# Patient Record
Sex: FEMALE | Race: OTHER | NOT HISPANIC OR LATINO | URBAN - METROPOLITAN AREA
[De-identification: names, ages, dates, MRNs, and addresses within clinical notes are randomized per-mention and may not be internally consistent; named-entity substitution may affect disease eponyms.]

---

## 2022-04-22 ENCOUNTER — OUTPATIENT (OUTPATIENT)
Dept: OUTPATIENT SERVICES | Facility: HOSPITAL | Age: 57
LOS: 1 days | End: 2022-04-22
Payer: COMMERCIAL

## 2022-04-22 LAB
APPEARANCE CSF: CLEAR — SIGNIFICANT CHANGE UP
APPEARANCE SPUN FLD: COLORLESS — SIGNIFICANT CHANGE UP
COLOR CSF: SIGNIFICANT CHANGE UP
CSF COMMENTS: SIGNIFICANT CHANGE UP
GLUCOSE CSF-MCNC: 67 MG/DL — SIGNIFICANT CHANGE UP (ref 40–70)
MONOS+MACROS NFR CSF: 1 % — LOW (ref 15–45)
NEUTROPHILS # CSF: 0 % — SIGNIFICANT CHANGE UP (ref 0–6)
NRBC NFR CSF: 1 /UL — SIGNIFICANT CHANGE UP (ref 0–5)
PROT CSF-MCNC: 23 MG/DL — SIGNIFICANT CHANGE UP (ref 15–45)
RBC # CSF: 0 /UL — SIGNIFICANT CHANGE UP (ref 0–0)
SARS-COV-2 RNA SPEC QL NAA+PROBE: SIGNIFICANT CHANGE UP
TUBE TYPE: SIGNIFICANT CHANGE UP

## 2022-04-22 PROCEDURE — 84157 ASSAY OF PROTEIN OTHER: CPT

## 2022-04-22 PROCEDURE — 62328 DX LMBR SPI PNXR W/FLUOR/CT: CPT

## 2022-04-22 PROCEDURE — 84394 TOTAL TAU: CPT

## 2022-04-22 PROCEDURE — 36415 COLL VENOUS BLD VENIPUNCTURE: CPT

## 2022-04-22 PROCEDURE — 83520 IMMUNOASSAY QUANT NOS NONAB: CPT

## 2022-04-22 PROCEDURE — 0035U NEURO CSF PRION PRTN QUAL: CPT

## 2022-04-22 PROCEDURE — 82945 GLUCOSE OTHER FLUID: CPT

## 2022-04-22 PROCEDURE — 89051 BODY FLUID CELL COUNT: CPT

## 2022-04-22 PROCEDURE — 87635 SARS-COV-2 COVID-19 AMP PRB: CPT

## 2022-04-22 RX ORDER — LINACLOTIDE 145 UG/1
145 CAPSULE, GELATIN COATED ORAL ONCE
Refills: 0 | Status: DISCONTINUED | OUTPATIENT
Start: 2022-04-22 | End: 2022-05-06

## 2022-05-04 LAB
14-3-3 AG CSF-MCNC: SIGNIFICANT CHANGE UP

## 2022-05-10 ENCOUNTER — APPOINTMENT (OUTPATIENT)
Dept: INTERVENTIONAL RADIOLOGY/VASCULAR | Facility: HOSPITAL | Age: 57
End: 2022-05-10

## 2022-05-23 PROBLEM — Z00.00 ENCOUNTER FOR PREVENTIVE HEALTH EXAMINATION: Status: ACTIVE | Noted: 2022-05-23

## 2022-06-01 ENCOUNTER — APPOINTMENT (OUTPATIENT)
Dept: INTERVENTIONAL RADIOLOGY/VASCULAR | Facility: HOSPITAL | Age: 57
End: 2022-06-01

## 2022-06-01 ENCOUNTER — OUTPATIENT (OUTPATIENT)
Dept: OUTPATIENT SERVICES | Facility: HOSPITAL | Age: 57
LOS: 1 days | End: 2022-06-01
Payer: COMMERCIAL

## 2022-06-01 LAB — SARS-COV-2 RNA SPEC QL NAA+PROBE: SIGNIFICANT CHANGE UP

## 2022-06-01 PROCEDURE — 62328 DX LMBR SPI PNXR W/FLUOR/CT: CPT

## 2022-06-01 PROCEDURE — 36415 COLL VENOUS BLD VENIPUNCTURE: CPT

## 2022-06-01 PROCEDURE — 87635 SARS-COV-2 COVID-19 AMP PRB: CPT

## 2023-04-28 ENCOUNTER — INPATIENT (INPATIENT)
Facility: HOSPITAL | Age: 58
LOS: 4 days | Discharge: HOME | End: 2023-05-03
Attending: HOSPITALIST | Admitting: HOSPITALIST
Payer: COMMERCIAL

## 2023-04-28 VITALS
RESPIRATION RATE: 17 BRPM | TEMPERATURE: 98 F | HEART RATE: 100 BPM | SYSTOLIC BLOOD PRESSURE: 127 MMHG | DIASTOLIC BLOOD PRESSURE: 99 MMHG | OXYGEN SATURATION: 100 %

## 2023-04-28 DIAGNOSIS — R41.82 ALTERED MENTAL STATUS, UNSPECIFIED: ICD-10-CM

## 2023-04-28 LAB
ALBUMIN SERPL ELPH-MCNC: 4.6 G/DL — SIGNIFICANT CHANGE UP (ref 3.3–5)
ALP SERPL-CCNC: 80 U/L — SIGNIFICANT CHANGE UP (ref 40–120)
ALT FLD-CCNC: 17 U/L — SIGNIFICANT CHANGE UP (ref 4–33)
ANION GAP SERPL CALC-SCNC: 24 MMOL/L — HIGH (ref 7–14)
APAP SERPL-MCNC: <10 UG/ML — LOW (ref 15–25)
AST SERPL-CCNC: 53 U/L — HIGH (ref 4–32)
BASOPHILS # BLD AUTO: 0.01 K/UL — SIGNIFICANT CHANGE UP (ref 0–0.2)
BASOPHILS NFR BLD AUTO: 0.1 % — SIGNIFICANT CHANGE UP (ref 0–2)
BILIRUB SERPL-MCNC: 1 MG/DL — SIGNIFICANT CHANGE UP (ref 0.2–1.2)
BUN SERPL-MCNC: 66 MG/DL — HIGH (ref 7–23)
CA-I BLD-SCNC: 1.25 MMOL/L — SIGNIFICANT CHANGE UP (ref 1.15–1.29)
CALCIUM SERPL-MCNC: 10.4 MG/DL — SIGNIFICANT CHANGE UP (ref 8.4–10.5)
CHLORIDE SERPL-SCNC: 109 MMOL/L — HIGH (ref 98–107)
CO2 SERPL-SCNC: 16 MMOL/L — LOW (ref 22–31)
CREAT SERPL-MCNC: 1.84 MG/DL — HIGH (ref 0.5–1.3)
EGFR: 31 ML/MIN/1.73M2 — LOW
EOSINOPHIL # BLD AUTO: 0 K/UL — SIGNIFICANT CHANGE UP (ref 0–0.5)
EOSINOPHIL NFR BLD AUTO: 0 % — SIGNIFICANT CHANGE UP (ref 0–6)
ETHANOL SERPL-MCNC: <10 MG/DL — SIGNIFICANT CHANGE UP
GLUCOSE SERPL-MCNC: 163 MG/DL — HIGH (ref 70–99)
HCT VFR BLD CALC: 38.2 % — SIGNIFICANT CHANGE UP (ref 34.5–45)
HGB BLD-MCNC: 12.9 G/DL — SIGNIFICANT CHANGE UP (ref 11.5–15.5)
IANC: 6.71 K/UL — SIGNIFICANT CHANGE UP (ref 1.8–7.4)
IMM GRANULOCYTES NFR BLD AUTO: 0.4 % — SIGNIFICANT CHANGE UP (ref 0–0.9)
LYMPHOCYTES # BLD AUTO: 0.9 K/UL — LOW (ref 1–3.3)
LYMPHOCYTES # BLD AUTO: 10.6 % — LOW (ref 13–44)
MCHC RBC-ENTMCNC: 32.7 PG — SIGNIFICANT CHANGE UP (ref 27–34)
MCHC RBC-ENTMCNC: 33.8 GM/DL — SIGNIFICANT CHANGE UP (ref 32–36)
MCV RBC AUTO: 96.7 FL — SIGNIFICANT CHANGE UP (ref 80–100)
MONOCYTES # BLD AUTO: 0.81 K/UL — SIGNIFICANT CHANGE UP (ref 0–0.9)
MONOCYTES NFR BLD AUTO: 9.6 % — SIGNIFICANT CHANGE UP (ref 2–14)
NEUTROPHILS # BLD AUTO: 6.71 K/UL — SIGNIFICANT CHANGE UP (ref 1.8–7.4)
NEUTROPHILS NFR BLD AUTO: 79.3 % — HIGH (ref 43–77)
NRBC # BLD: 0 /100 WBCS — SIGNIFICANT CHANGE UP (ref 0–0)
NRBC # FLD: 0 K/UL — SIGNIFICANT CHANGE UP (ref 0–0)
PLATELET # BLD AUTO: 166 K/UL — SIGNIFICANT CHANGE UP (ref 150–400)
POTASSIUM SERPL-MCNC: 4 MMOL/L — SIGNIFICANT CHANGE UP (ref 3.5–5.3)
POTASSIUM SERPL-SCNC: 4 MMOL/L — SIGNIFICANT CHANGE UP (ref 3.5–5.3)
PROT SERPL-MCNC: 7.5 G/DL — SIGNIFICANT CHANGE UP (ref 6–8.3)
RBC # BLD: 3.95 M/UL — SIGNIFICANT CHANGE UP (ref 3.8–5.2)
RBC # FLD: 13.9 % — SIGNIFICANT CHANGE UP (ref 10.3–14.5)
SALICYLATES SERPL-MCNC: <0.3 MG/DL — LOW (ref 15–30)
SODIUM SERPL-SCNC: 149 MMOL/L — HIGH (ref 135–145)
T3 SERPL-MCNC: 55 NG/DL — LOW (ref 80–200)
T4 AB SER-ACNC: 7.2 UG/DL — SIGNIFICANT CHANGE UP (ref 5.1–13)
TOXICOLOGY SCREEN, DRUGS OF ABUSE, SERUM RESULT: SIGNIFICANT CHANGE UP
TSH SERPL-MCNC: 2.49 UIU/ML — SIGNIFICANT CHANGE UP (ref 0.27–4.2)
WBC # BLD: 8.46 K/UL — SIGNIFICANT CHANGE UP (ref 3.8–10.5)
WBC # FLD AUTO: 8.46 K/UL — SIGNIFICANT CHANGE UP (ref 3.8–10.5)

## 2023-04-28 PROCEDURE — 71045 X-RAY EXAM CHEST 1 VIEW: CPT | Mod: 26

## 2023-04-28 PROCEDURE — 99285 EMERGENCY DEPT VISIT HI MDM: CPT

## 2023-04-28 PROCEDURE — 99223 1ST HOSP IP/OBS HIGH 75: CPT

## 2023-04-28 PROCEDURE — 70450 CT HEAD/BRAIN W/O DYE: CPT | Mod: 26,MA

## 2023-04-28 RX ORDER — SODIUM CHLORIDE 9 MG/ML
1000 INJECTION INTRAMUSCULAR; INTRAVENOUS; SUBCUTANEOUS ONCE
Refills: 0 | Status: COMPLETED | OUTPATIENT
Start: 2023-04-28 | End: 2023-04-28

## 2023-04-28 RX ORDER — SODIUM CHLORIDE 9 MG/ML
500 INJECTION INTRAMUSCULAR; INTRAVENOUS; SUBCUTANEOUS ONCE
Refills: 0 | Status: DISCONTINUED | OUTPATIENT
Start: 2023-04-28 | End: 2023-04-28

## 2023-04-28 RX ORDER — MIDAZOLAM HYDROCHLORIDE 1 MG/ML
4 INJECTION, SOLUTION INTRAMUSCULAR; INTRAVENOUS ONCE
Refills: 0 | Status: DISCONTINUED | OUTPATIENT
Start: 2023-04-28 | End: 2023-04-28

## 2023-04-28 RX ADMIN — SODIUM CHLORIDE 1000 MILLILITER(S): 9 INJECTION INTRAMUSCULAR; INTRAVENOUS; SUBCUTANEOUS at 19:24

## 2023-04-28 RX ADMIN — MIDAZOLAM HYDROCHLORIDE 4 MILLIGRAM(S): 1 INJECTION, SOLUTION INTRAMUSCULAR; INTRAVENOUS at 19:52

## 2023-04-28 RX ADMIN — SODIUM CHLORIDE 1000 MILLILITER(S): 9 INJECTION INTRAMUSCULAR; INTRAVENOUS; SUBCUTANEOUS at 21:18

## 2023-04-28 NOTE — ED ADULT NURSE NOTE - NSIMPLEMENTINTERV_GEN_ALL_ED
Implemented All Fall Risk Interventions:  [place] to call system. Call bell, personal items and telephone within reach. Instruct patient to call for assistance. Room bathroom lighting operational. Non-slip footwear when patient is off stretcher. Physically safe environment: no spills, clutter or unnecessary equipment. Stretcher in lowest position, wheels locked, appropriate side rails in place. Provide visual cue, wrist band, yellow gown, etc. Monitor gait and stability. Monitor for mental status changes and reorient to person, place, and time. Review medications for side effects contributing to fall risk. Reinforce activity limits and safety measures with patient and family.

## 2023-04-28 NOTE — ED PROVIDER NOTE - ATTENDING CONTRIBUTION TO CARE
Seen and examined, discussed w resident, pt. given assigned name due to privacy issues. Seen and examined, discussed w resident, pt. given assigned name due to privacy issues.  57 female to ED after found on floor next to empty bottle of vodka this a.m.  Patient initially with decreased verbal and decreased responsiveness, in ED with alertness, inappropriate responses to questioning, but obeying commands.  Patient has recent diagnosis of early onset dementia.  Has history of substance use including alcohol and cocaine.  Previously treated for Graves' and currently on Synthroid.  Patient arrives with legal guardian and care manager and is unable to make her own decisions at time of eval.  Plan of care including admission discussed with care manager, contact numbers documented in chart.  No reported history of recent illness, no fever/chills, no history of documented trauma, no use of blood thinners.  MMM, proptotic eyes, EOMI, neck supple, clear lungs, heart reg, abd soft, NT to palp, no nancy/guarding, no CVAT, no edema, NT calves, O x 2, inappropriate to questioning, poor short term memory.

## 2023-04-28 NOTE — ED ADULT TRIAGE NOTE - DIRECT TO ROOM CARE INITIATED:
Reason for Disposition  • SEVERE chest pain    Protocols used: CHEST PAIN-A-AH     28-Apr-2023 17:45

## 2023-04-28 NOTE — ED PROVIDER NOTE - CLINICAL SUMMARY MEDICAL DECISION MAKING FREE TEXT BOX
56yo F w/ history of graves (on 75 of synthroid), constipation and polysubstance use disorder coming in w/ social workers for concerns of worsening mental status; concerning for possible worsening thyroid disorder vs electrolyte abnormalities vs worsening frontal lobe dementia; will eval w/ labs, tox screen, ct head and likely admit

## 2023-04-28 NOTE — ED ADULT NURSE REASSESSMENT NOTE - NS ED NURSE REASSESS COMMENT FT1
Hand off received from JIMMY Mejia, pt in stretcher, pt on tele with capnography monitoring, breathing even and unlabored. right ac20g inserted using aseptic technique with blood return noted and flushed well. stretcher in lowest position. pending ct scan, will continue to monitor.

## 2023-04-28 NOTE — ED PROVIDER NOTE - PROGRESS NOTE DETAILS
Pt. has legal guardian Sadia Justinjonathanharvey: [phone]  Pt. has Care manager and pt. advocate Rosina Gallardo [phone] [place],PGY3: Unclear etiology of AMS; patient HDS. No clinical signs of meningitis. Will admit for further work up

## 2023-04-28 NOTE — ED PROVIDER NOTE - NS ED ROS FT
General: denies fever, chills  HENT: denies nasal congestion, rhinorrhea  Eyes: denies visual changes, blurred vision  CV: denies chest pain, palpitations  Resp: denies difficulty breathing, cough  Abdominal: denies nausea, vomiting, diarrhea, abdominal pain  : denies urinary pain or discharge  MSK: denies muscle aches, leg swelling  Neuro: confusion  Skin: denies rashes, bruises

## 2023-04-28 NOTE — ED ADULT NURSE NOTE - OBJECTIVE STATEMENT
pt received to rm 1  , a&ox2 , ambulatory , pmh of graves disease, polysubstance abuse, and frontal lobe dementia , p/w SW for concerns regarding her mental health status. pt noted to anxious, confused regarding situation. pt also endorsed decreased PO intake  . Pt breathing even and unlabored on room air. NSR on cardiac monitor. Denies fever, chills, cough, SOB, chest pain, palpitations, dizziness, N/V/D, constipation, numbness, tingling. IV placed. Labs collected and sent. EKG in chart. pending lab R and repeat EKG  . pt educated on fall precautions and confirms understanding via teach back method. Stretcher locked in lowest position with siderails up x2. Per LEONID Waldron  pt is allowed to keep cellphone . pt other belongings will be secured with secuirty.

## 2023-04-28 NOTE — ED PROVIDER NOTE - PHYSICAL EXAMINATION
GENERAL: well appearing in no apparent distress  HEAD: normocephalic, atraumatic  HENT: airway intact  EYES: +Proptosis; pupils constricted 1+, reactive to light b/l   CARDIAC: regular rate and rhythm, normal S1S2, no appreciable murmurs, 2+ pulses in UE/LE b/l  PULM: normal breath sounds, clear to ascultation bilaterally, no rales, rhonchi, wheezing  GI: abdomen nondistended, soft, nontender, no guarding, rebound tenderness  NEURO: no focal motor or sensory deficits, minimally participatory on exam  MSK: no peripheral edema  SKIN: well-perfused, extremities warm, no visible rashes

## 2023-04-28 NOTE — ED ADULT TRIAGE NOTE - MODE OF ARRIVAL
Walk in Patient is now fully awake, with vital signs and temperature stable, hydration is adequate, patients Herber’s  score is at baseline (or greater than 8), patient and escort has received  discharge education.

## 2023-04-28 NOTE — ED PROVIDER NOTE - OBJECTIVE STATEMENT
58yo F w/ history of graves (on 75 of synthroid), constipation and polysubstance use disorder coming in w/ social workers for concerns of worsening mental status. History provided by aids: States patient has had alcohol and cocaine use disorder for several years. Was admitted to a rehab facility last August and was recently diagnosed with frontal lobe dementia. SW at bedside states she has decreased PO intake, increasingly worsening confusion and aggression for 1 month. Was found on the floor today with a nearly empty bottle of vodka.   Patient is minimal verbal on exam and only answers yes and no questions.

## 2023-04-28 NOTE — ED ADULT NURSE REASSESSMENT NOTE - NS ED NURSE REASSESS COMMENT FT1
pt noted to be agitated and refusing to allow belongings to be secured. pt denies pain at this time. MD Burris brought to bedside. pt medicated per MD orders. KASIA Jha and KASIA Limon made aware.

## 2023-04-29 DIAGNOSIS — R41.82 ALTERED MENTAL STATUS, UNSPECIFIED: ICD-10-CM

## 2023-04-29 DIAGNOSIS — N17.9 ACUTE KIDNEY FAILURE, UNSPECIFIED: ICD-10-CM

## 2023-04-29 DIAGNOSIS — G93.49 OTHER ENCEPHALOPATHY: ICD-10-CM

## 2023-04-29 DIAGNOSIS — E05.00 THYROTOXICOSIS WITH DIFFUSE GOITER WITHOUT THYROTOXIC CRISIS OR STORM: ICD-10-CM

## 2023-04-29 DIAGNOSIS — R93.0 ABNORMAL FINDINGS ON DIAGNOSTIC IMAGING OF SKULL AND HEAD, NOT ELSEWHERE CLASSIFIED: ICD-10-CM

## 2023-04-29 DIAGNOSIS — G31.09 OTHER FRONTOTEMPORAL NEUROCOGNITIVE DISORDER: ICD-10-CM

## 2023-04-29 LAB
A1C WITH ESTIMATED AVERAGE GLUCOSE RESULT: 4.4 % — SIGNIFICANT CHANGE UP (ref 4–5.6)
ALBUMIN SERPL ELPH-MCNC: 3.2 G/DL — LOW (ref 3.3–5)
ALP SERPL-CCNC: 59 U/L — SIGNIFICANT CHANGE UP (ref 40–120)
ALT FLD-CCNC: 14 U/L — SIGNIFICANT CHANGE UP (ref 4–33)
AMPHET UR-MCNC: NEGATIVE — SIGNIFICANT CHANGE UP
ANION GAP SERPL CALC-SCNC: 16 MMOL/L — HIGH (ref 7–14)
ANION GAP SERPL CALC-SCNC: 19 MMOL/L — HIGH (ref 7–14)
APPEARANCE UR: ABNORMAL
AST SERPL-CCNC: 39 U/L — HIGH (ref 4–32)
BACTERIA # UR AUTO: NEGATIVE — SIGNIFICANT CHANGE UP
BARBITURATES UR SCN-MCNC: NEGATIVE — SIGNIFICANT CHANGE UP
BENZODIAZ UR-MCNC: POSITIVE
BILIRUB SERPL-MCNC: 0.6 MG/DL — SIGNIFICANT CHANGE UP (ref 0.2–1.2)
BILIRUB UR-MCNC: NEGATIVE — SIGNIFICANT CHANGE UP
BUN SERPL-MCNC: 45 MG/DL — HIGH (ref 7–23)
BUN SERPL-MCNC: 65 MG/DL — HIGH (ref 7–23)
CALCIUM SERPL-MCNC: 8.8 MG/DL — SIGNIFICANT CHANGE UP (ref 8.4–10.5)
CALCIUM SERPL-MCNC: 9.1 MG/DL — SIGNIFICANT CHANGE UP (ref 8.4–10.5)
CHLORIDE SERPL-SCNC: 110 MMOL/L — HIGH (ref 98–107)
CHLORIDE SERPL-SCNC: 114 MMOL/L — HIGH (ref 98–107)
CHOLEST SERPL-MCNC: 171 MG/DL — SIGNIFICANT CHANGE UP
CK SERPL-CCNC: 1322 U/L — HIGH (ref 25–170)
CK SERPL-CCNC: 1559 U/L — HIGH (ref 25–170)
CO2 SERPL-SCNC: 14 MMOL/L — LOW (ref 22–31)
CO2 SERPL-SCNC: 18 MMOL/L — LOW (ref 22–31)
COCAINE METAB.OTHER UR-MCNC: NEGATIVE — SIGNIFICANT CHANGE UP
COLOR SPEC: YELLOW — SIGNIFICANT CHANGE UP
CREAT SERPL-MCNC: 0.87 MG/DL — SIGNIFICANT CHANGE UP (ref 0.5–1.3)
CREAT SERPL-MCNC: 1.37 MG/DL — HIGH (ref 0.5–1.3)
CREATININE URINE RESULT, DAU: 94 MG/DL — SIGNIFICANT CHANGE UP
DIFF PNL FLD: NEGATIVE — SIGNIFICANT CHANGE UP
EGFR: 45 ML/MIN/1.73M2 — LOW
EGFR: 77 ML/MIN/1.73M2 — SIGNIFICANT CHANGE UP
EPI CELLS # UR: 12 /HPF — HIGH (ref 0–5)
ESTIMATED AVERAGE GLUCOSE: 80 — SIGNIFICANT CHANGE UP
FOLATE SERPL-MCNC: 6.2 NG/ML — SIGNIFICANT CHANGE UP (ref 3.1–17.5)
GLUCOSE BLDC GLUCOMTR-MCNC: 114 MG/DL — HIGH (ref 70–99)
GLUCOSE SERPL-MCNC: 106 MG/DL — HIGH (ref 70–99)
GLUCOSE SERPL-MCNC: 132 MG/DL — HIGH (ref 70–99)
GLUCOSE UR QL: NEGATIVE — SIGNIFICANT CHANGE UP
HCT VFR BLD CALC: 33 % — LOW (ref 34.5–45)
HDLC SERPL-MCNC: 103 MG/DL — SIGNIFICANT CHANGE UP
HGB BLD-MCNC: 11 G/DL — LOW (ref 11.5–15.5)
HYALINE CASTS # UR AUTO: 6 /LPF — SIGNIFICANT CHANGE UP (ref 0–7)
KETONES UR-MCNC: ABNORMAL
LEUKOCYTE ESTERASE UR-ACNC: NEGATIVE — SIGNIFICANT CHANGE UP
LIPID PNL WITH DIRECT LDL SERPL: 48 MG/DL — SIGNIFICANT CHANGE UP
MAGNESIUM SERPL-MCNC: 1.9 MG/DL — SIGNIFICANT CHANGE UP (ref 1.6–2.6)
MCHC RBC-ENTMCNC: 33.3 GM/DL — SIGNIFICANT CHANGE UP (ref 32–36)
MCHC RBC-ENTMCNC: 34.1 PG — HIGH (ref 27–34)
MCV RBC AUTO: 102.2 FL — HIGH (ref 80–100)
METHADONE UR-MCNC: NEGATIVE — SIGNIFICANT CHANGE UP
NITRITE UR-MCNC: NEGATIVE — SIGNIFICANT CHANGE UP
NON HDL CHOLESTEROL: 68 MG/DL — SIGNIFICANT CHANGE UP
NRBC # BLD: 0 /100 WBCS — SIGNIFICANT CHANGE UP (ref 0–0)
NRBC # FLD: 0.02 K/UL — HIGH (ref 0–0)
OPIATES UR-MCNC: NEGATIVE — SIGNIFICANT CHANGE UP
OXYCODONE UR-MCNC: NEGATIVE — SIGNIFICANT CHANGE UP
PCP SPEC-MCNC: SIGNIFICANT CHANGE UP
PCP UR-MCNC: NEGATIVE — SIGNIFICANT CHANGE UP
PH UR: 6 — SIGNIFICANT CHANGE UP (ref 5–8)
PLATELET # BLD AUTO: 139 K/UL — LOW (ref 150–400)
POTASSIUM SERPL-MCNC: 3.2 MMOL/L — LOW (ref 3.5–5.3)
POTASSIUM SERPL-MCNC: 3.7 MMOL/L — SIGNIFICANT CHANGE UP (ref 3.5–5.3)
POTASSIUM SERPL-SCNC: 3.2 MMOL/L — LOW (ref 3.5–5.3)
POTASSIUM SERPL-SCNC: 3.7 MMOL/L — SIGNIFICANT CHANGE UP (ref 3.5–5.3)
PROT SERPL-MCNC: 5.9 G/DL — LOW (ref 6–8.3)
PROT UR-MCNC: ABNORMAL
RBC # BLD: 3.23 M/UL — LOW (ref 3.8–5.2)
RBC # FLD: 14.2 % — SIGNIFICANT CHANGE UP (ref 10.3–14.5)
RBC CASTS # UR COMP ASSIST: 3 /HPF — SIGNIFICANT CHANGE UP (ref 0–4)
SODIUM SERPL-SCNC: 143 MMOL/L — SIGNIFICANT CHANGE UP (ref 135–145)
SODIUM SERPL-SCNC: 148 MMOL/L — HIGH (ref 135–145)
SP GR SPEC: 1.02 — SIGNIFICANT CHANGE UP (ref 1.01–1.05)
T PALLIDUM AB TITR SER: NEGATIVE — SIGNIFICANT CHANGE UP
THC UR QL: NEGATIVE — SIGNIFICANT CHANGE UP
TRIGL SERPL-MCNC: 101 MG/DL — SIGNIFICANT CHANGE UP
UROBILINOGEN FLD QL: SIGNIFICANT CHANGE UP
VIT B12 SERPL-MCNC: 632 PG/ML — SIGNIFICANT CHANGE UP (ref 200–900)
VIT D25+D1,25 OH+D1,25 PNL SERPL-MCNC: 32.7 PG/ML — SIGNIFICANT CHANGE UP (ref 19.9–79.3)
WBC # BLD: 5.15 K/UL — SIGNIFICANT CHANGE UP (ref 3.8–10.5)
WBC # FLD AUTO: 5.15 K/UL — SIGNIFICANT CHANGE UP (ref 3.8–10.5)
WBC UR QL: 6 /HPF — HIGH (ref 0–5)

## 2023-04-29 PROCEDURE — 99255 IP/OBS CONSLTJ NEW/EST HI 80: CPT

## 2023-04-29 PROCEDURE — 99291 CRITICAL CARE FIRST HOUR: CPT

## 2023-04-29 PROCEDURE — 99223 1ST HOSP IP/OBS HIGH 75: CPT

## 2023-04-29 PROCEDURE — 12345: CPT | Mod: NC

## 2023-04-29 RX ORDER — NALOXONE HYDROCHLORIDE 4 MG/.1ML
0.1 SPRAY NASAL ONCE
Refills: 0 | Status: COMPLETED | OUTPATIENT
Start: 2023-04-29 | End: 2023-04-29

## 2023-04-29 RX ORDER — ATORVASTATIN CALCIUM 80 MG/1
40 TABLET, FILM COATED ORAL AT BEDTIME
Refills: 0 | Status: DISCONTINUED | OUTPATIENT
Start: 2023-04-29 | End: 2023-04-29

## 2023-04-29 RX ORDER — LANOLIN ALCOHOL/MO/W.PET/CERES
3 CREAM (GRAM) TOPICAL AT BEDTIME
Refills: 0 | Status: DISCONTINUED | OUTPATIENT
Start: 2023-04-29 | End: 2023-05-03

## 2023-04-29 RX ORDER — ENOXAPARIN SODIUM 100 MG/ML
40 INJECTION SUBCUTANEOUS EVERY 24 HOURS
Refills: 0 | Status: DISCONTINUED | OUTPATIENT
Start: 2023-04-29 | End: 2023-04-29

## 2023-04-29 RX ORDER — HEPARIN SODIUM 5000 [USP'U]/ML
5000 INJECTION INTRAVENOUS; SUBCUTANEOUS EVERY 8 HOURS
Refills: 0 | Status: DISCONTINUED | OUTPATIENT
Start: 2023-04-29 | End: 2023-05-03

## 2023-04-29 RX ORDER — ACETAMINOPHEN 500 MG
650 TABLET ORAL EVERY 6 HOURS
Refills: 0 | Status: DISCONTINUED | OUTPATIENT
Start: 2023-04-29 | End: 2023-05-03

## 2023-04-29 RX ORDER — SODIUM CHLORIDE 9 MG/ML
1000 INJECTION, SOLUTION INTRAVENOUS
Refills: 0 | Status: DISCONTINUED | OUTPATIENT
Start: 2023-04-29 | End: 2023-04-30

## 2023-04-29 RX ORDER — LEVOTHYROXINE SODIUM 125 MCG
75 TABLET ORAL DAILY
Refills: 0 | Status: DISCONTINUED | OUTPATIENT
Start: 2023-04-29 | End: 2023-04-30

## 2023-04-29 RX ORDER — SODIUM CHLORIDE 9 MG/ML
1000 INJECTION, SOLUTION INTRAVENOUS ONCE
Refills: 0 | Status: COMPLETED | OUTPATIENT
Start: 2023-04-29 | End: 2023-04-29

## 2023-04-29 RX ORDER — POTASSIUM CHLORIDE 20 MEQ
10 PACKET (EA) ORAL ONCE
Refills: 0 | Status: COMPLETED | OUTPATIENT
Start: 2023-04-29 | End: 2023-04-29

## 2023-04-29 RX ORDER — ASPIRIN/CALCIUM CARB/MAGNESIUM 324 MG
81 TABLET ORAL DAILY
Refills: 0 | Status: DISCONTINUED | OUTPATIENT
Start: 2023-04-29 | End: 2023-05-01

## 2023-04-29 RX ORDER — ONDANSETRON 8 MG/1
4 TABLET, FILM COATED ORAL EVERY 8 HOURS
Refills: 0 | Status: DISCONTINUED | OUTPATIENT
Start: 2023-04-29 | End: 2023-05-03

## 2023-04-29 RX ORDER — LEVOTHYROXINE SODIUM 125 MCG
56 TABLET ORAL ONCE
Refills: 0 | Status: DISCONTINUED | OUTPATIENT
Start: 2023-04-29 | End: 2023-04-29

## 2023-04-29 RX ADMIN — NALOXONE HYDROCHLORIDE 0.1 MILLIGRAM(S): 4 SPRAY NASAL at 01:25

## 2023-04-29 RX ADMIN — SODIUM CHLORIDE 100 MILLILITER(S): 9 INJECTION, SOLUTION INTRAVENOUS at 01:51

## 2023-04-29 RX ADMIN — Medication 81 MILLIGRAM(S): at 11:05

## 2023-04-29 RX ADMIN — Medication 75 MICROGRAM(S): at 05:33

## 2023-04-29 RX ADMIN — HEPARIN SODIUM 5000 UNIT(S): 5000 INJECTION INTRAVENOUS; SUBCUTANEOUS at 21:51

## 2023-04-29 RX ADMIN — SODIUM CHLORIDE 1000 MILLILITER(S): 9 INJECTION, SOLUTION INTRAVENOUS at 01:10

## 2023-04-29 RX ADMIN — HEPARIN SODIUM 5000 UNIT(S): 5000 INJECTION INTRAVENOUS; SUBCUTANEOUS at 13:58

## 2023-04-29 RX ADMIN — SODIUM CHLORIDE 100 MILLILITER(S): 9 INJECTION, SOLUTION INTRAVENOUS at 13:57

## 2023-04-29 RX ADMIN — HEPARIN SODIUM 5000 UNIT(S): 5000 INJECTION INTRAVENOUS; SUBCUTANEOUS at 05:33

## 2023-04-29 RX ADMIN — Medication 100 MILLIEQUIVALENT(S): at 01:51

## 2023-04-29 NOTE — RAPID RESPONSE TEAM SUMMARY - NSSITUATIONBACKGROUNDRRT_GEN_ALL_CORE
57 yr old female with a pmh of frontal lobe dementia, Graves disease now on levothyroxine 75mcg daily, constipation and polysubstance abuse who presents with her  with concerns for worsening mental status. Rapid response called for concern for mental status on arrival to the floor.  Upon arrival to the rapid, patient's vital signs stable, systolic blood pressures 120s to 140s, respiratory rate 12-16, heart rate 70s to 90s, saturating 94-97% on RA. Initial evaluation significant for decreased responsiveness to verbal and tactile stimuli.  Patient  with minimal response to sternal rub, occasionally would verbalize first name, but overall notably depressed but arousable mental status.  Neuro exam limited given patient's mental status however pupils pinpoint, 2+ upper extremity and lower extremity pulses, cardiac exam with regular rate and rhythm, no elicited abdominal tenderness, patient moving all 4 extremities without focality appreciated, however exam is limited.  Chart reviewed, recent labs with no immediately actionable abnormalities to explain mental status, CT head reviewed without any noted acute intracranial pathology. Given pinpoint pupils, Narcan 0.1 administered via IV, with minimal improvement in mental status.  Primary attending at bedside during rapid, confirmed that there was no marked change in mental status from ED  (Patient given 4 of Versed 5 hours prior to rapid). Discussion had with primary attending to hold off on CT head given no marked change in mental status, but low threshold to repeat CT head given duration of time since Versed administration if no change in arousability.  Also recommended transfer to floor where end-tidal CO2 could be monitored given depressed mental status, and telemetry monitoring given age indeterminate infarct with mental status. Primary team to obtain urine studies to further evaluate. LR @ 100 cc/hour started, decision made to end rapid.

## 2023-04-29 NOTE — CHART NOTE - NSCHARTNOTEFT_GEN_A_CORE
saw and examined pt  H&P and ED course reviewed  57 yr old female with a pmh of frontal lobe dementia, Graves disease now on levothyroxine 75mcg daily, constipation and polysubstance abuse who presents with her  with concerns for worsening mental status.   Encephalopathy here - pt grossly nonfocal - although AOx2, often incoherent and tangential answerers  CT noted for age-indeterminate infarct, awaiting [facility], started on ASA, Statin - check TTE w bubble  would check RPR, Folate, B12, UA - TSH ok on synthroid  neuro eval   at this time no other obvious toxic-metabolic etiologies short of work up above - pt reported w ETOH abuse, was found w empty bottle of Vodka prior to arrival - suspect ETOH hallucinosis at that time, now resolved - no s/s of withdrawal - will place on symptom triggered CIWA for now and monitor.  DAWIT w mild hypernatremia and AG - prerenal and likely d.t dehydration ; resolving - now milena diet - trend and will likely stop IVF by evelina   hypokalemia - supplement   serum tox screen neg, will add Utox  overall suspect progression of dementia w some behavioral disturbance - no need for psych at this time as no agitation reported   DVT ppx

## 2023-04-29 NOTE — H&P ADULT - PROBLEM SELECTOR PLAN 1
Worsening AMS per   Per ED nurse pt appeared as if she was hallucinating   blood alcohol <10  Pending UA and Utox  Syphilis and Vitamin D level in AM

## 2023-04-29 NOTE — CONSULT NOTE ADULT - SUBJECTIVE AND OBJECTIVE BOX
Neurology - Consult Note    -  Spectra: [phone] ([facility]), [phone] ([facility])  -    HPI: Patient KALE DURON is a 57y (1965) woman with a PMHx significant for frontotemporal lobe dementia, Graves disease now on levothyroxine 75mcg daily, constipation and polysubstance abuse who presents with her  with concerns for worsening mental status. Patient found down with empty bottle of Vodka by her . Per chart review, prior to arrival there were suspected ETOH hallucinosis at that time, now resolved. In ED patient received Narcan with no improvement of symptoms. Neurology consulted for age-indeterminate infarct.       Review of Systems:     Allergies:  No Known Allergies      PMHx/PSHx/Family Hx: As above, otherwise see below   Graves disease  Frontotemporal dementia    Social Hx:      Medications:  MEDICATIONS  (STANDING):  aspirin  chewable 81 milliGRAM(s) Oral daily  atorvastatin 40 milliGRAM(s) Oral at bedtime  heparin   Injectable 5000 Unit(s) SubCutaneous every 8 hours  lactated ringers. 1000 milliLiter(s) (100 mL/Hr) IV Continuous <Continuous>  levothyroxine 75 MICROGram(s) Oral daily    MEDICATIONS  (PRN):  acetaminophen     Tablet .. 650 milliGRAM(s) Oral every 6 hours PRN Temp greater or equal to 38C (100.4F), Mild Pain (1 - 3)  aluminum hydroxide/magnesium hydroxide/simethicone Suspension 30 milliLiter(s) Oral every 4 hours PRN Dyspepsia  melatonin 3 milliGRAM(s) Oral at bedtime PRN Insomnia  ondansetron Injectable 4 milliGRAM(s) IV Push every 8 hours PRN Nausea and/or Vomiting      Vitals:  T(C): 36.7 (04-29-23 @ 10:40), Max: 36.7 (04-29-23 @ 05:33)  HR: 84 (04-29-23 @ 10:40) (84 - 100)  BP: 107/69 (04-29-23 @ 10:40) (102/91 - 133/82)  RR: 17 (04-29-23 @ 10:40) (12 - 17)  SpO2: 100% (04-29-23 @ 10:40) (99% - 100%)    Physical Examination: INCOMPLETE  Neurologic Exam:  Mental status - Awake, Alert, Oriented to person, place, and time. Speech fluent, repetition and naming intact. Follows simple and complex commands. Attention/concentration, recent and remote memory (including registration and recall), and fund of knowledge intact    Cranial nerves - PERRLA, VFF, EOMI, face sensation (V1-V3) intact b/l, facial strength intact without asymmetry b/l, hearing intact b/l, palate with symmetric elevation, trapezius OR sternocleidomastiod 5/5 strength b/l, tongue midline on protrusion with full lateral movement    Motor - Normal bulk and tone throughout. No pronator drift.  Strength testing            Deltoid      Biceps      Triceps     Wrist Extension    Wrist Flexion     Interossei         R            5                 5               5                     5                              5                        5                 5  L             5                 5               5                     5                              5                        5                 5              Hip Flexion    Hip Extension    Knee Flexion    Knee Extension    Dorsiflexion    Plantar Flexion  R              5                           5                       5                           5                            5                          5  L              5                           5                        5                           5                            5                          5    Sensation - Light touch/temperature OR pain/vibration intact throughout    DTR's -             Biceps      Triceps     Brachioradialis      Patellar    Ankle    Toes/plantar response  R             2+             2+                  2+                       2+            2+                 Down  L              2+             2+                 2+                        2+           2+                 Down    Coordination - Finger to Nose intact b/l. No tremors appreciated    Gait and station - Normal casual gait. Romberg (-)    Labs:                        11.0   5.15  )-----------( 139      ( 29 Apr 2023 11:40 )             33.0     04-29    143  |  110<H>  |  45<H>  ----------------------------<  106<H>  3.7   |  14<L>  |  0.87    Ca    8.8      29 Apr 2023 11:40  Mg     1.90     04-28    TPro  5.9<L>  /  Alb  3.2<L>  /  TBili  0.6  /  DBili  x   /  AST  39<H>  /  ALT  14  /  AlkPhos  59  04-29    CAPILLARY BLOOD GLUCOSE      POCT Blood Glucose.: 114 mg/dL (29 Apr 2023 00:43)    LIVER FUNCTIONS - ( 29 Apr 2023 11:40 )  Alb: 3.2 g/dL / Pro: 5.9 g/dL / ALK PHOS: 59 U/L / ALT: 14 U/L / AST: 39 U/L / GGT: x           Radiology:  CT Head No Cont:  (28 Apr 2023 21:26)     Neurology - Consult Note    -  Spectra: [phone] ([facility]), [phone] ([facility])  -  HPI: Patient KALE DURON is a 57y (1965) woman with a PMHx significant for frontotemporal lobe dementia, Graves disease now on levothyroxine 75mcg daily, constipation and polysubstance abuse who presents with her  with concerns for worsening mental status. Patient found down with empty bottle of Vodka by her . Per chart review, prior to arrival there were suspected ETOH hallucinosis at that time, now resolved. In ED patient received Narcan with no improvement of symptoms. Neurology consulted for age-indeterminate infarct. Spoke with temporary guardian Sadia. Patient was diagnosed with frontotemporal dementia about a year ago at [facility]. She follows with a neurologist at [place] who has not started     Review of Systems:   Allergies:  No Known Allergies    PMHx/PSHx/Family Hx: As above, otherwise see below   Graves disease  Frontotemporal dementia    Social Hx:    Medications:  MEDICATIONS  (STANDING):  aspirin  chewable 81 milliGRAM(s) Oral daily  atorvastatin 40 milliGRAM(s) Oral at bedtime  heparin   Injectable 5000 Unit(s) SubCutaneous every 8 hours  lactated ringers. 1000 milliLiter(s) (100 mL/Hr) IV Continuous <Continuous>  levothyroxine 75 MICROGram(s) Oral daily    MEDICATIONS  (PRN):  acetaminophen     Tablet .. 650 milliGRAM(s) Oral every 6 hours PRN Temp greater or equal to 38C (100.4F), Mild Pain (1 - 3)  aluminum hydroxide/magnesium hydroxide/simethicone Suspension 30 milliLiter(s) Oral every 4 hours PRN Dyspepsia  melatonin 3 milliGRAM(s) Oral at bedtime PRN Insomnia  ondansetron Injectable 4 milliGRAM(s) IV Push every 8 hours PRN Nausea and/or Vomiting    Vitals:  T(C): 36.7 (04-29-23 @ 10:40), Max: 36.7 (04-29-23 @ 05:33)  HR: 84 (04-29-23 @ 10:40) (84 - 100)  BP: 107/69 (04-29-23 @ 10:40) (102/91 - 133/82)  RR: 17 (04-29-23 @ 10:40) (12 - 17)  SpO2: 100% (04-29-23 @ 10:40) (99% - 100%)    Physical Examination: INCOMPLETE  Neurologic Exam:  Mental status - Awake, Alert, Oriented to person, place, and time. Speech fluent, repetition and naming intact. Follows simple and complex commands. Attention/concentration, recent and remote memory (including registration and recall), and fund of knowledge intact    Cranial nerves - PERRLA, VFF, EOMI, face sensation (V1-V3) intact b/l, facial strength intact without asymmetry b/l, hearing intact b/l, palate with symmetric elevation, trapezius OR sternocleidomastiod 5/5 strength b/l, tongue midline on protrusion with full lateral movement    Motor - Normal bulk and tone throughout. No pronator drift.  Strength testing            Deltoid      Biceps      Triceps     Wrist Extension    Wrist Flexion     Interossei         R            5                 5               5                     5                              5                        5                 5  L             5                 5               5                     5                              5                        5                 5              Hip Flexion    Hip Extension    Knee Flexion    Knee Extension    Dorsiflexion    Plantar Flexion  R              5                           5                       5                           5                            5                          5  L              5                           5                        5                           5                            5                          5    Sensation - Light touch/temperature OR pain/vibration intact throughout    DTR's -             Biceps      Triceps     Brachioradialis      Patellar    Ankle    Toes/plantar response  R             2+             2+                  2+                       2+            2+                 Down  L              2+             2+                 2+                        2+           2+                 Down    Coordination - Finger to Nose intact b/l. No tremors appreciated    Gait and station - Normal casual gait. Romberg (-)    Labs:                        11.0   5.15  )-----------( 139      ( 29 Apr 2023 11:40 )             33.0     04-29    143  |  110<H>  |  45<H>  ----------------------------<  106<H>  3.7   |  14<L>  |  0.87    Ca    8.8      29 Apr 2023 11:40  Mg     1.90     04-28    TPro  5.9<L>  /  Alb  3.2<L>  /  TBili  0.6  /  DBili  x   /  AST  39<H>  /  ALT  14  /  AlkPhos  59  04-29    CAPILLARY BLOOD GLUCOSE      POCT Blood Glucose.: 114 mg/dL (29 Apr 2023 00:43)    LIVER FUNCTIONS - ( 29 Apr 2023 11:40 )  Alb: 3.2 g/dL / Pro: 5.9 g/dL / ALK PHOS: 59 U/L / ALT: 14 U/L / AST: 39 U/L / GGT: x           Radiology:  CT Head No Cont:  (28 Apr 2023 21:26)     Neurology - Consult Note    -  Spectra: [phone] ([facility]), [phone] ([facility])  -  HPI: Patient KALE DURON is a 57y (1965) woman with a PMHx significant for frontotemporal lobe dementia, Graves disease now on levothyroxine 75mcg daily, constipation and polysubstance abuse who presents with her  with concerns for worsening mental status. Patient found down with empty bottle of Vodka by her . Per chart review, prior to arrival there were suspected ETOH hallucinosis at that time, now resolved. In ED patient received Narcan with no improvement of symptoms. Neurology consulted for age-indeterminate infarct. Spoke with temporary guardian Sadia. Patient was diagnosed with frontotemporal dementia about a year ago at [facility]. She follows with a neurologist at [place] who has not started her on any medications. Patient's guardian notes her symptoms of word finding difficulty and behaviors have worsened since diagnosis.     Review of Systems:   Allergies:  No Known Allergies    PMHx/PSHx/Family Hx: As above, otherwise see below   Graves disease  Frontotemporal dementia    Social Hx:    Medications:  MEDICATIONS  (STANDING):  aspirin  chewable 81 milliGRAM(s) Oral daily  atorvastatin 40 milliGRAM(s) Oral at bedtime  heparin   Injectable 5000 Unit(s) SubCutaneous every 8 hours  lactated ringers. 1000 milliLiter(s) (100 mL/Hr) IV Continuous <Continuous>  levothyroxine 75 MICROGram(s) Oral daily    MEDICATIONS  (PRN):  acetaminophen     Tablet .. 650 milliGRAM(s) Oral every 6 hours PRN Temp greater or equal to 38C (100.4F), Mild Pain (1 - 3)  aluminum hydroxide/magnesium hydroxide/simethicone Suspension 30 milliLiter(s) Oral every 4 hours PRN Dyspepsia  melatonin 3 milliGRAM(s) Oral at bedtime PRN Insomnia  ondansetron Injectable 4 milliGRAM(s) IV Push every 8 hours PRN Nausea and/or Vomiting    Vitals:  T(C): 36.7 (04-29-23 @ 10:40), Max: 36.7 (04-29-23 @ 05:33)  HR: 84 (04-29-23 @ 10:40) (84 - 100)  BP: 107/69 (04-29-23 @ 10:40) (102/91 - 133/82)  RR: 17 (04-29-23 @ 10:40) (12 - 17)  SpO2: 100% (04-29-23 @ 10:40) (99% - 100%)    Physical Examination: INCOMPLETE  Neurologic Exam:  Mental status - Awake, Alert, Oriented to person, place, and time. Speech fluent, repetition and naming intact. Follows simple and complex commands. Attention/concentration, recent and remote memory (including registration and recall), and fund of knowledge intact    Cranial nerves - PERRLA, VFF, EOMI, face sensation (V1-V3) intact b/l, facial strength intact without asymmetry b/l, hearing intact b/l, palate with symmetric elevation, trapezius OR sternocleidomastiod 5/5 strength b/l, tongue midline on protrusion with full lateral movement    Motor - Normal bulk and tone throughout. No pronator drift.  Strength testing            Deltoid      Biceps      Triceps     Wrist Extension    Wrist Flexion     Interossei         R            5                 5               5                     5                              5                        5                 5  L             5                 5               5                     5                              5                        5                 5              Hip Flexion    Hip Extension    Knee Flexion    Knee Extension    Dorsiflexion    Plantar Flexion  R              5                           5                       5                           5                            5                          5  L              5                           5                        5                           5                            5                          5    Sensation - Light touch/temperature OR pain/vibration intact throughout    DTR's -             Biceps      Triceps     Brachioradialis      Patellar    Ankle    Toes/plantar response  R             2+             2+                  2+                       2+            2+                 Down  L              2+             2+                 2+                        2+           2+                 Down    Coordination - Finger to Nose intact b/l. No tremors appreciated    Gait and station - Normal casual gait. Romberg (-)    Labs:                        11.0   5.15  )-----------( 139      ( 29 Apr 2023 11:40 )             33.0     04-29    143  |  110<H>  |  45<H>  ----------------------------<  106<H>  3.7   |  14<L>  |  0.87    Ca    8.8      29 Apr 2023 11:40  Mg     1.90     04-28    TPro  5.9<L>  /  Alb  3.2<L>  /  TBili  0.6  /  DBili  x   /  AST  39<H>  /  ALT  14  /  AlkPhos  59  04-29    CAPILLARY BLOOD GLUCOSE      POCT Blood Glucose.: 114 mg/dL (29 Apr 2023 00:43)    LIVER FUNCTIONS - ( 29 Apr 2023 11:40 )  Alb: 3.2 g/dL / Pro: 5.9 g/dL / ALK PHOS: 59 U/L / ALT: 14 U/L / AST: 39 U/L / GGT: x           Radiology:  CT Head No Cont:  (28 Apr 2023 21:26)     Neurology - Consult Note    -  Spectra: [phone] ([facility]), [phone] ([facility])  -  HPI: Patient KALE DURON is a 57y (1965) woman with a PMHx significant for frontotemporal lobe dementia, Graves disease now on levothyroxine 75mcg daily, constipation and polysubstance abuse who presents with her  with concerns for worsening mental status. Patient found down with empty bottle of Vodka by her . Per chart review, prior to arrival there were suspected ETOH hallucinosis at that time, now resolved. In ED patient received Narcan with no improvement of symptoms. Neurology consulted for age-indeterminate infarct. Spoke with temporary guardian Sadia. Patient was diagnosed with frontotemporal dementia about a year ago at [facility]. She follows with a neurologist at [place] who has not started her on any medications. Patient's guardian notes her symptoms of word finding difficulty and behaviors have worsened since diagnosis.     Review of Systems:   Allergies:  No Known Allergies    PMHx/PSHx/Family Hx: As above, otherwise see below   Graves disease  Frontotemporal dementia    Social Hx:    Medications:  MEDICATIONS  (STANDING):  aspirin  chewable 81 milliGRAM(s) Oral daily  atorvastatin 40 milliGRAM(s) Oral at bedtime  heparin   Injectable 5000 Unit(s) SubCutaneous every 8 hours  lactated ringers. 1000 milliLiter(s) (100 mL/Hr) IV Continuous <Continuous>  levothyroxine 75 MICROGram(s) Oral daily    MEDICATIONS  (PRN):  acetaminophen     Tablet .. 650 milliGRAM(s) Oral every 6 hours PRN Temp greater or equal to 38C (100.4F), Mild Pain (1 - 3)  aluminum hydroxide/magnesium hydroxide/simethicone Suspension 30 milliLiter(s) Oral every 4 hours PRN Dyspepsia  melatonin 3 milliGRAM(s) Oral at bedtime PRN Insomnia  ondansetron Injectable 4 milliGRAM(s) IV Push every 8 hours PRN Nausea and/or Vomiting    Vitals:  T(C): 36.7 (04-29-23 @ 10:40), Max: 36.7 (04-29-23 @ 05:33)  HR: 84 (04-29-23 @ 10:40) (84 - 100)  BP: 107/69 (04-29-23 @ 10:40) (102/91 - 133/82)  RR: 17 (04-29-23 @ 10:40) (12 - 17)  SpO2: 100% (04-29-23 @ 10:40) (99% - 100%)      Neuro  Cranial nerves - PERRLA, VFF, EOMI, face sensation (V1-V3) intact b/l, facial strength intact without asymmetry b/l, hearing intact b/l, palate with symmetric elevation, trapezius 5/5 strength b/l, tongue midline on protrusion with full lateral movement    Motor - Normal bulk and tone throughout. No pronator drift.  Strength testing            Deltoid      Biceps      Triceps          R            5                 5               5                     5                              L             5                 5               5                     5                                          Hip Flexion        Knee Flexion    Knee Extension    Dorsiflexion    Plantar Flexion  R              5                           5                       5                           5                            5                            L              5                           5                        5                           5                            5                              Sensation - Light touch intact throughout    DTR's -             Biceps      Triceps     Brachioradialis      Patellar    Ankle    Toes/plantar response  R             2+             2+                  2+                       2+            2+                 Down  L              2+             2+                 2+                        2+           2+                 Down    Coordination - Finger to Nose intact b/l. No tremors appreciated    Gait and station - Normal casual gait.     Labs:                        11.0   5.15  )-----------( 139      ( 29 Apr 2023 11:40 )             33.0     04-29    143  |  110<H>  |  45<H>  ----------------------------<  106<H>  3.7   |  14<L>  |  0.87    Ca    8.8      29 Apr 2023 11:40  Mg     1.90     04-28    TPro  5.9<L>  /  Alb  3.2<L>  /  TBili  0.6  /  DBili  x   /  AST  39<H>  /  ALT  14  /  AlkPhos  59  04-29    CAPILLARY BLOOD GLUCOSE      POCT Blood Glucose.: 114 mg/dL (29 Apr 2023 00:43)    LIVER FUNCTIONS - ( 29 Apr 2023 11:40 )  Alb: 3.2 g/dL / Pro: 5.9 g/dL / ALK PHOS: 59 U/L / ALT: 14 U/L / AST: 39 U/L / GGT: x           Radiology:  CT Head No Cont:  (28 Apr 2023 21:26)     Neurology - Consult Note    -  Spectra: [phone] ([facility]), [phone] ([facility])  -  HPI: Patient KALE DURON is a 57y (1965) woman with a PMHx significant for frontotemporal lobe dementia, Graves disease now on levothyroxine 75mcg daily, constipation and polysubstance abuse who presents with her  with concerns for worsening mental status. Patient found down with empty bottle of Vodka by her . Per chart review, prior to arrival there were suspected ETOH hallucinosis at that time, now resolved. In ED patient received Narcan with no improvement of symptoms. Neurology consulted for age-indeterminate infarct. Spoke with temporary guardian Sadia. Patient was diagnosed with frontotemporal dementia about a year ago at [facility]. She follows with a neurologist at [place] who has not started her on any medications. Patient's guardian notes her symptoms of word finding difficulty and behaviors have worsened since diagnosis.     Review of Systems:   Allergies:  No Known Allergies    PMHx/PSHx/Family Hx: As above, otherwise see below   Graves disease  Frontotemporal dementia    Social Hx:    Medications:  MEDICATIONS  (STANDING):  aspirin  chewable 81 milliGRAM(s) Oral daily  atorvastatin 40 milliGRAM(s) Oral at bedtime  heparin   Injectable 5000 Unit(s) SubCutaneous every 8 hours  lactated ringers. 1000 milliLiter(s) (100 mL/Hr) IV Continuous <Continuous>  levothyroxine 75 MICROGram(s) Oral daily    MEDICATIONS  (PRN):  acetaminophen     Tablet .. 650 milliGRAM(s) Oral every 6 hours PRN Temp greater or equal to 38C (100.4F), Mild Pain (1 - 3)  aluminum hydroxide/magnesium hydroxide/simethicone Suspension 30 milliLiter(s) Oral every 4 hours PRN Dyspepsia  melatonin 3 milliGRAM(s) Oral at bedtime PRN Insomnia  ondansetron Injectable 4 milliGRAM(s) IV Push every 8 hours PRN Nausea and/or Vomiting    Vitals:  T(C): 36.7 (04-29-23 @ 10:40), Max: 36.7 (04-29-23 @ 05:33)  HR: 84 (04-29-23 @ 10:40) (84 - 100)  BP: 107/69 (04-29-23 @ 10:40) (102/91 - 133/82)  RR: 17 (04-29-23 @ 10:40) (12 - 17)  SpO2: 100% (04-29-23 @ 10:40) (99% - 100%)    Gen: proptosis.   Neuro    Mental status:  Awake, alert, and oriented to person, July 2023, Hospital, not [facility]  [facility].  ?Q in $1.  Tangential.  Speaks in incomplete sentences.  Hesitancy.  Naming delayed but intact.  Spontaneous speech is nonfluent.  repetition is incorrect Memory impaired.        Cranial nerves - PERRLA, VFF, EOMI, face sensation (V1-V3) intact b/l, facial strength intact without asymmetry b/l, hearing intact b/l, palate with symmetric elevation, trapezius 5/5 strength b/l, tongue midline on protrusion with full lateral movement    Motor - Normal bulk and tone throughout. No pronator drift.  Strength testing            Deltoid      Biceps      Triceps          R            5                 5               5                     5                              L             5                 5               5                     5                                          Hip Flexion        Knee Flexion    Knee Extension    Dorsiflexion    Plantar Flexion  R              5                           5                       5                           5                            5                            L              5                           5                        5                           5                            5                              Sensation - Light touch intact throughout    DTR's -             Biceps      Triceps     Brachioradialis      Patellar    Ankle    Toes/plantar response  R             2+             2+                  2+                       2+            2+                 Down  L              2+             2+                 2+                        2+           2+                 Down    Coordination - Finger to Nose intact b/l. No tremors appreciated    Gait and station - Normal casual gait.     Labs:                        11.0   5.15  )-----------( 139      ( 29 Apr 2023 11:40 )             33.0     04-29    143  |  110<H>  |  45<H>  ----------------------------<  106<H>  3.7   |  14<L>  |  0.87    Ca    8.8      29 Apr 2023 11:40  Mg     1.90     04-28    TPro  5.9<L>  /  Alb  3.2<L>  /  TBili  0.6  /  DBili  x   /  AST  39<H>  /  ALT  14  /  AlkPhos  59  04-29    CAPILLARY BLOOD GLUCOSE      POCT Blood Glucose.: 114 mg/dL (29 Apr 2023 00:43)    LIVER FUNCTIONS - ( 29 Apr 2023 11:40 )  Alb: 3.2 g/dL / Pro: 5.9 g/dL / ALK PHOS: 59 U/L / ALT: 14 U/L / AST: 39 U/L / GGT: x           Radiology:  CT Head No Cont:  (28 Apr 2023 21:26)

## 2023-04-29 NOTE — H&P ADULT - HISTORY OF PRESENT ILLNESS
57 yr old female with a pmh of frontal lobe dementia, Graves disease now on levothyroxine 75mcg daily, constipation and polysubstance abuse who presents with her  with concerns for worsening mental status.   Per ED note pt was diagnosed with frontal lobe dementia ~8/2022. For the past month she has had decreased PO intake, worsening AMS and aggression for the past month. The patient was found on the floor today near an empty bottle of vodka.   ROS unable to obtain 2/2 sedation s/p versed  Vitals: T 97.8, HR 95, ARTURO 133/82, RR 12 satting 100% RA

## 2023-04-29 NOTE — PATIENT PROFILE ADULT - FALL HARM RISK - HARM RISK INTERVENTIONS

## 2023-04-29 NOTE — H&P ADULT - PROBLEM SELECTOR PLAN 2
New  CT head: Age indeterminant infarct in the left parieto-occipital temporal region. Although there is some volume loss and extra-axial ventricular dilatation which could suggest chronic component, recommend MRI of the brain for further evaluation.  Will move patient to telemetry  MRI brain ordered, MRA head ordered  asa and statin started, neurochecks Q4  Lipid panel and a1c in AM  Hold off ECHO with bubble pending above results

## 2023-04-29 NOTE — H&P ADULT - CRITICAL CARE ATTENDING COMMENT
Rapid response called for decreased responsiveness   Pt hemodynamically stable    PHYSICAL EXAM:  GENERAL: NAD, comfortable at bedside - sedated awakens to sternal rub  HEAD:  Atraumatic, Normocephalic  EYES: EOMI, conjunctiva and sclera clear, pinpoint pupils with minimal reaction to light   NECK: Supple, No JVD  CHEST/LUNG: Clear to auscultation bilaterally; No wheezes, rales or rhonchi  HEART: Regular rate and rhythm; No murmurs, rubs, or gallops, (+)S1, S2  ABDOMEN: Soft, Nontender, Nondistended; Normal Bowel sounds   EXTREMITIES:  2+ Peripheral Pulses, No clubbing, cyanosis, or edema  PSYCH: unable to obtain 2/2 sedation s/p versed  NEUROLOGY: sedated awakens to sternal rub- asked to squeeze bilateral hands with 5/5 strength in bilateral upper extremities   SKIN: No rashes or lesions    s/p IV narcan with no change in status  Pending UA/Utox  LR bolus administered with LR 100ml/hr maintenance   Continue to monitor at this time   Repeat EKG for AM ordered

## 2023-04-29 NOTE — PATIENT PROFILE ADULT - NSTRANSFERBELONGINGSDISPO_GEN_A_NUR
given to security/with patient Belongings on unit:  Patient's cell phone  Black Raisa bracelet  Gold samina jeni bracelet  Gold bracelet/given to security/with patient

## 2023-04-29 NOTE — CONSULT NOTE ADULT - ASSESSMENT
Assessment:  CK 1554--> 1322 likely from unknown down time vs seizure event     Impression:    Plan:   [] MRI brain without contrast  [] MRA Head & Neck  [] TTE with bubble study  [] aspirin 81mg daily  [x] Lipid panel, LDL goal <70 (48) can use low dose statin or stop  [x] hemoglobin A1c 4.4  [x] follow up primary team labs: b12 (wnl), folate (wnl)  [] follow up urine toxicology  Assessment:57y (1965) woman with a PMHx significant for frontotemporal lobe dementia, Graves disease now on levothyroxine 75mcg daily, constipation and polysubstance abuse who presents with her  with concerns for worsening mental status. Patient found down with empty bottle of Vodka by her . Per chart review, prior to arrival there were suspected ETOH hallucinosis at that time, now resolved. In ED patient received Narcan with no improvement of symptoms. Neurology consulted for age-indeterminate infarct. Spoke with temporary guardian Sadia. Patient was diagnosed with frontotemporal dementia about a year ago at [facility]. She follows with a neurologist at [place] who has not started her on any medications. Patient's guardian notes her symptoms of word finding difficulty and behaviors have worsened since diagnosis.  CK 1554--> 1322 likely from unknown length of time down. Imaging review with Neurology attending. Likely not age-indeterminant infarct but likely atrophy from frontotemporal dementia.     Impression: worsening behavorial and language issues likely 2/2 frontotemporal dementia in combination with etoh use disorder.     Plan:   [] MRI brain with and without contrast  [x] Lipid panel, LDL goal <70 (48) can use low dose statin or stop  [x] hemoglobin A1c 4.4  [x] follow up primary team labs: b12 (wnl), folate (wnl)    Case seen and discussed with Neurology attending Dr. Huerta Assessment:57y (1965) woman with a PMHx significant for frontotemporal lobe dementia, Graves disease now on levothyroxine 75mcg daily, constipation and polysubstance abuse who presents with her  with concerns for worsening mental status. Patient found down with empty bottle of Vodka by her . Per chart review, prior to arrival there were suspected ETOH hallucinosis at that time, now resolved. In ED patient received Narcan with no improvement of symptoms. Neurology consulted for age-indeterminate infarct. Spoke with temporary guardian Sadia. Patient was diagnosed with frontotemporal dementia about a year ago at [facility]. She follows with a neurologist at [place] who has not started her on any medications. Patient's guardian notes her symptoms of word finding difficulty and behaviors have worsened since diagnosis.  CK 1554--> 1322 likely from unknown length of time down. Imaging review with Neurology attending. Likely not age-indeterminant infarct but likely atrophy from frontotemporal dementia.     Impression: worsening behavorial and language issues likely 2/2 frontotemporal dementia in combination with etoh use disorder.     Plan:   no further neurological inpatient work-up required at this time  [x] Lipid panel, LDL goal <70 (48) can use low dose statin or stop  [x] hemoglobin A1c 4.4  [x] follow up primary team labs: b12 (wnl), folate (wnl)    Case seen and discussed with Neurology attending Dr. Huerta

## 2023-04-29 NOTE — H&P ADULT - NSHPPHYSICALEXAM_GEN_ALL_CORE
PHYSICAL EXAM:  GENERAL: NAD, comfortable at bedside - sedated  HEAD:  Atraumatic, Normocephalic  EYES: EOMI, conjunctiva and sclera clear, pinpoint pupils with minimal reaction to light   NECK: Supple, No JVD  CHEST/LUNG: Clear to auscultation bilaterally; No wheezes, rales or rhonchi  HEART: Regular rate and rhythm; No murmurs, rubs, or gallops, (+)S1, S2  ABDOMEN: Soft, Nontender, Nondistended; Normal Bowel sounds   EXTREMITIES:  2+ Peripheral Pulses, No clubbing, cyanosis, or edema  PSYCH: unable to obtain 2/2 sedation s/p versed  NEUROLOGY: unable to obtain 2/2 sedation s/p versed, awakens to painful stimuli  SKIN: No rashes or lesions PHYSICAL EXAM:  GENERAL: NAD, comfortable at bedside - sedated  HEAD:  Atraumatic, Normocephalic  EYES: EOMI, conjunctiva and sclera clear, pinpoint pupils with minimal reaction to light   NECK: Supple, No JVD  CHEST/LUNG: Clear to auscultation bilaterally; No wheezes, rales or rhonchi  HEART: Regular rate and rhythm; No murmurs, rubs, or gallops, (+)S1, S2  ABDOMEN: Soft, Nontender, Nondistended; Normal Bowel sounds   EXTREMITIES:  2+ Peripheral Pulses, No clubbing, cyanosis, or edema  PSYCH: unable to obtain 2/2 sedation s/p versed  NEUROLOGY: unable to obtain 2/2 sedation s/p versed, awakens to painful stimuli  SKIN: chronic skin changes of the bilateral lower extremities

## 2023-04-29 NOTE — H&P ADULT - PROBLEM SELECTOR PLAN 4
New  Cr 1.84-> 1.37  s/p NS 2.5L-> will continue with LR 100ml/hr   Urine studies ordered, CPK added on  K 3.2-> repleted, Mg level added on   avoid nephrotoxic medications and renally dose medications  strict i/o

## 2023-04-29 NOTE — H&P ADULT - NSHPLABSRESULTS_GEN_ALL_CORE
labs personally reviewed and pertinent [facility] documents/labs/diagnostics reviewed                         12.9   8.46  )-----------( 166      ( 28 Apr 2023 18:49 )             38.2       04-28    148<H>  |  114<H>  |  65<H>  ----------------------------<  132<H>  3.2<L>   |  18<L>  |  1.37<H>    Ca    9.1      28 Apr 2023 23:30    TPro  7.5  /  Alb  4.6  /  TBili  1.0  /  DBili  x   /  AST  53<H>  /  ALT  17  /  AlkPhos  80  04-28      EKG interpreted by myself: artifact- grossly nonischemic- will order repeat  CT head interpreted by radiology: Age indeterminant infarct in the left parieto-occipital temporal region. Although there is some volume loss and extra-axial ventricular dilatation which could suggest chronic component, recommend MRI of the brain for further evaluation.

## 2023-04-29 NOTE — CONSULT NOTE ADULT - ATTENDING COMMENTS
Ms. Arguello is a 56 yo woman with frontotemporal dementia.   She has a neurologist and will continue to f/u regularly.  CT head reviewed by me - encephalomalacia, focal atrophy left temporal. - not c/w infarct.  She has known focal atrophy. This is chronic related to her underlying neurodegenerative disease.   Neurology signing off. Please reconsult PRN or call [phone] with any questions.  Thank you

## 2023-04-30 LAB
ANION GAP SERPL CALC-SCNC: 10 MMOL/L — SIGNIFICANT CHANGE UP (ref 7–14)
BASOPHILS # BLD AUTO: 0 K/UL — SIGNIFICANT CHANGE UP (ref 0–0.2)
BASOPHILS NFR BLD AUTO: 0 % — SIGNIFICANT CHANGE UP (ref 0–2)
BUN SERPL-MCNC: 20 MG/DL — SIGNIFICANT CHANGE UP (ref 7–23)
CALCIUM SERPL-MCNC: 8.7 MG/DL — SIGNIFICANT CHANGE UP (ref 8.4–10.5)
CHLORIDE SERPL-SCNC: 111 MMOL/L — HIGH (ref 98–107)
CO2 SERPL-SCNC: 27 MMOL/L — SIGNIFICANT CHANGE UP (ref 22–31)
CREAT SERPL-MCNC: 0.69 MG/DL — SIGNIFICANT CHANGE UP (ref 0.5–1.3)
EGFR: 101 ML/MIN/1.73M2 — SIGNIFICANT CHANGE UP
EOSINOPHIL # BLD AUTO: 0.02 K/UL — SIGNIFICANT CHANGE UP (ref 0–0.5)
EOSINOPHIL NFR BLD AUTO: 0.5 % — SIGNIFICANT CHANGE UP (ref 0–6)
GLUCOSE SERPL-MCNC: 83 MG/DL — SIGNIFICANT CHANGE UP (ref 70–99)
HCT VFR BLD CALC: 31.4 % — LOW (ref 34.5–45)
HCV AB S/CO SERPL IA: 0.21 S/CO — SIGNIFICANT CHANGE UP (ref 0–0.99)
HCV AB SERPL-IMP: SIGNIFICANT CHANGE UP
HGB BLD-MCNC: 10.5 G/DL — LOW (ref 11.5–15.5)
IANC: 1.94 K/UL — SIGNIFICANT CHANGE UP (ref 1.8–7.4)
IMM GRANULOCYTES NFR BLD AUTO: 0.2 % — SIGNIFICANT CHANGE UP (ref 0–0.9)
LYMPHOCYTES # BLD AUTO: 1.79 K/UL — SIGNIFICANT CHANGE UP (ref 1–3.3)
LYMPHOCYTES # BLD AUTO: 43 % — SIGNIFICANT CHANGE UP (ref 13–44)
MCHC RBC-ENTMCNC: 33.4 GM/DL — SIGNIFICANT CHANGE UP (ref 32–36)
MCHC RBC-ENTMCNC: 33.8 PG — SIGNIFICANT CHANGE UP (ref 27–34)
MCV RBC AUTO: 101 FL — HIGH (ref 80–100)
MONOCYTES # BLD AUTO: 0.4 K/UL — SIGNIFICANT CHANGE UP (ref 0–0.9)
MONOCYTES NFR BLD AUTO: 9.6 % — SIGNIFICANT CHANGE UP (ref 2–14)
NEUTROPHILS # BLD AUTO: 1.94 K/UL — SIGNIFICANT CHANGE UP (ref 1.8–7.4)
NEUTROPHILS NFR BLD AUTO: 46.7 % — SIGNIFICANT CHANGE UP (ref 43–77)
NRBC # BLD: 0 /100 WBCS — SIGNIFICANT CHANGE UP (ref 0–0)
NRBC # FLD: 0 K/UL — SIGNIFICANT CHANGE UP (ref 0–0)
PLATELET # BLD AUTO: 118 K/UL — LOW (ref 150–400)
POTASSIUM SERPL-MCNC: 3.8 MMOL/L — SIGNIFICANT CHANGE UP (ref 3.5–5.3)
POTASSIUM SERPL-SCNC: 3.8 MMOL/L — SIGNIFICANT CHANGE UP (ref 3.5–5.3)
RBC # BLD: 3.11 M/UL — LOW (ref 3.8–5.2)
RBC # FLD: 14 % — SIGNIFICANT CHANGE UP (ref 10.3–14.5)
SODIUM SERPL-SCNC: 148 MMOL/L — HIGH (ref 135–145)
WBC # BLD: 4.16 K/UL — SIGNIFICANT CHANGE UP (ref 3.8–10.5)
WBC # FLD AUTO: 4.16 K/UL — SIGNIFICANT CHANGE UP (ref 3.8–10.5)

## 2023-04-30 PROCEDURE — 99232 SBSQ HOSP IP/OBS MODERATE 35: CPT

## 2023-04-30 RX ADMIN — HEPARIN SODIUM 5000 UNIT(S): 5000 INJECTION INTRAVENOUS; SUBCUTANEOUS at 06:30

## 2023-04-30 RX ADMIN — HEPARIN SODIUM 5000 UNIT(S): 5000 INJECTION INTRAVENOUS; SUBCUTANEOUS at 22:15

## 2023-04-30 RX ADMIN — HEPARIN SODIUM 5000 UNIT(S): 5000 INJECTION INTRAVENOUS; SUBCUTANEOUS at 13:58

## 2023-04-30 RX ADMIN — Medication 81 MILLIGRAM(S): at 11:01

## 2023-04-30 RX ADMIN — Medication 75 MICROGRAM(S): at 06:30

## 2023-04-30 NOTE — PHYSICAL THERAPY INITIAL EVALUATION ADULT - GENERAL OBSERVATIONS, REHAB EVAL
Pt received semi-supine in bed, all lines intact, in NAD. Pt agreeable to participate in PT evaluation.

## 2023-04-30 NOTE — CHART NOTE - NSCHARTNOTEFT_GEN_A_CORE
Case discussed with Dr. Barrett, recommend to hold Levothyroxine for now pending clarification with outpatient PCP tomorrow as Pt has history of Grave's disease, TFTs reviewed. Case discussed with Dr. Barrett, recommend to hold Levothyroxine for now pending clarification with outpatient PCP tomorrow as Pt has history of Grave's disease, TFTs reviewed.    Neurology recommendations appreciated, no indication for further work up at this time, findings are chronic related to her underlying neurodegenerative disease. Discussed with Dr. Barrett, MRI orders discontinued.

## 2023-04-30 NOTE — PHYSICAL THERAPY INITIAL EVALUATION ADULT - ADDITIONAL COMMENTS
Pt lives in a private house alone, was independent with all functional mobility and ADL performance without using an assistive device prior to admission.     Pt left semi-supine in bed, all lines intact, all needs in reach, bed alarm set, in NAD. JIMMY ocasio.

## 2023-04-30 NOTE — PROGRESS NOTE ADULT - PROBLEM SELECTOR PLAN 1
Chief Complaint   Patient presents with     RECHECK     Hepatitis B   Pt roomed, vitals, meds, and allergies reviewed with pt. Pt ready for provider.  Darrel Lester, CMA   Encephalopathy here - pt grossly nonfocal - although AOx2-3, had incoherent and tangential answerers now improved today  CT noted for age-indeterminate infarct,   syphilis, Folate, B12, UA, TSH ok   neuro eval mg - no concern for CVA- no further work up   at this time no other obvious toxic-metabolic etiologies short of work up above - pt reported w ETOH abuse, was found w empty bottle of Vodka prior to arrival - suspect ETOH hallucinosis at that time, now resolved - no s/s of withdrawal, CIWA 1

## 2023-04-30 NOTE — PROGRESS NOTE ADULT - PROBLEM SELECTOR PLAN 5
TSH 2.49  Continue levothyroxine 75mcg daily - not sure why shes on synthroid when reported to have hx of hyperthyroid - need to clarify this prior to dc - reaching out to out-pt provider and pt pharmacy

## 2023-04-30 NOTE — PHYSICAL THERAPY INITIAL EVALUATION ADULT - GAIT DEVIATIONS NOTED, PT EVAL
decreased ricardo/increased time in double stance/decreased step length/decreased stride length/decreased weight-shifting ability

## 2023-05-01 DIAGNOSIS — G93.41 METABOLIC ENCEPHALOPATHY: ICD-10-CM

## 2023-05-01 DIAGNOSIS — E86.0 DEHYDRATION: ICD-10-CM

## 2023-05-01 DIAGNOSIS — I69.319 UNSPECIFIED SYMPTOMS AND SIGNS INVOLVING COGNITIVE FUNCTIONS FOLLOWING CEREBRAL INFARCTION: ICD-10-CM

## 2023-05-01 DIAGNOSIS — G92.8 OTHER TOXIC ENCEPHALOPATHY: ICD-10-CM

## 2023-05-01 DIAGNOSIS — Z29.9 ENCOUNTER FOR PROPHYLACTIC MEASURES, UNSPECIFIED: ICD-10-CM

## 2023-05-01 DIAGNOSIS — R74.8 ABNORMAL LEVELS OF OTHER SERUM ENZYMES: ICD-10-CM

## 2023-05-01 LAB
ANION GAP SERPL CALC-SCNC: 12 MMOL/L — SIGNIFICANT CHANGE UP (ref 7–14)
BASOPHILS # BLD AUTO: 0.01 K/UL — SIGNIFICANT CHANGE UP (ref 0–0.2)
BASOPHILS NFR BLD AUTO: 0.2 % — SIGNIFICANT CHANGE UP (ref 0–2)
BUN SERPL-MCNC: 11 MG/DL — SIGNIFICANT CHANGE UP (ref 7–23)
CALCIUM SERPL-MCNC: 9 MG/DL — SIGNIFICANT CHANGE UP (ref 8.4–10.5)
CHLORIDE SERPL-SCNC: 106 MMOL/L — SIGNIFICANT CHANGE UP (ref 98–107)
CO2 SERPL-SCNC: 27 MMOL/L — SIGNIFICANT CHANGE UP (ref 22–31)
CREAT SERPL-MCNC: 0.63 MG/DL — SIGNIFICANT CHANGE UP (ref 0.5–1.3)
EGFR: 103 ML/MIN/1.73M2 — SIGNIFICANT CHANGE UP
EOSINOPHIL # BLD AUTO: 0.07 K/UL — SIGNIFICANT CHANGE UP (ref 0–0.5)
EOSINOPHIL NFR BLD AUTO: 1.4 % — SIGNIFICANT CHANGE UP (ref 0–6)
GLUCOSE SERPL-MCNC: 74 MG/DL — SIGNIFICANT CHANGE UP (ref 70–99)
HCT VFR BLD CALC: 34.4 % — LOW (ref 34.5–45)
HGB BLD-MCNC: 11.1 G/DL — LOW (ref 11.5–15.5)
IANC: 2.34 K/UL — SIGNIFICANT CHANGE UP (ref 1.8–7.4)
IMM GRANULOCYTES NFR BLD AUTO: 0.6 % — SIGNIFICANT CHANGE UP (ref 0–0.9)
LYMPHOCYTES # BLD AUTO: 2.28 K/UL — SIGNIFICANT CHANGE UP (ref 1–3.3)
LYMPHOCYTES # BLD AUTO: 44.1 % — HIGH (ref 13–44)
MAGNESIUM SERPL-MCNC: 1.6 MG/DL — SIGNIFICANT CHANGE UP (ref 1.6–2.6)
MCHC RBC-ENTMCNC: 32.3 GM/DL — SIGNIFICANT CHANGE UP (ref 32–36)
MCHC RBC-ENTMCNC: 32.8 PG — SIGNIFICANT CHANGE UP (ref 27–34)
MCV RBC AUTO: 101.8 FL — HIGH (ref 80–100)
MONOCYTES # BLD AUTO: 0.44 K/UL — SIGNIFICANT CHANGE UP (ref 0–0.9)
MONOCYTES NFR BLD AUTO: 8.5 % — SIGNIFICANT CHANGE UP (ref 2–14)
NEUTROPHILS # BLD AUTO: 2.34 K/UL — SIGNIFICANT CHANGE UP (ref 1.8–7.4)
NEUTROPHILS NFR BLD AUTO: 45.2 % — SIGNIFICANT CHANGE UP (ref 43–77)
NRBC # BLD: 0 /100 WBCS — SIGNIFICANT CHANGE UP (ref 0–0)
NRBC # FLD: 0.03 K/UL — HIGH (ref 0–0)
PHOSPHATE SERPL-MCNC: 1.4 MG/DL — LOW (ref 2.5–4.5)
PLATELET # BLD AUTO: 137 K/UL — LOW (ref 150–400)
POTASSIUM SERPL-MCNC: 4.6 MMOL/L — SIGNIFICANT CHANGE UP (ref 3.5–5.3)
POTASSIUM SERPL-SCNC: 4.6 MMOL/L — SIGNIFICANT CHANGE UP (ref 3.5–5.3)
RBC # BLD: 3.38 M/UL — LOW (ref 3.8–5.2)
RBC # FLD: 13.8 % — SIGNIFICANT CHANGE UP (ref 10.3–14.5)
SODIUM SERPL-SCNC: 145 MMOL/L — SIGNIFICANT CHANGE UP (ref 135–145)
WBC # BLD: 5.17 K/UL — SIGNIFICANT CHANGE UP (ref 3.8–10.5)
WBC # FLD AUTO: 5.17 K/UL — SIGNIFICANT CHANGE UP (ref 3.8–10.5)

## 2023-05-01 PROCEDURE — 93306 TTE W/DOPPLER COMPLETE: CPT | Mod: 26

## 2023-05-01 PROCEDURE — 99233 SBSQ HOSP IP/OBS HIGH 50: CPT

## 2023-05-01 RX ORDER — POTASSIUM PHOSPHATE, MONOBASIC POTASSIUM PHOSPHATE, DIBASIC 236; 224 MG/ML; MG/ML
30 INJECTION, SOLUTION INTRAVENOUS ONCE
Refills: 0 | Status: DISCONTINUED | OUTPATIENT
Start: 2023-05-01 | End: 2023-05-01

## 2023-05-01 RX ORDER — ATORVASTATIN CALCIUM 80 MG/1
40 TABLET, FILM COATED ORAL AT BEDTIME
Refills: 0 | Status: DISCONTINUED | OUTPATIENT
Start: 2023-05-01 | End: 2023-05-01

## 2023-05-01 RX ORDER — ASPIRIN/CALCIUM CARB/MAGNESIUM 324 MG
81 TABLET ORAL DAILY
Refills: 0 | Status: DISCONTINUED | OUTPATIENT
Start: 2023-05-01 | End: 2023-05-03

## 2023-05-01 RX ORDER — LEVOTHYROXINE SODIUM 125 MCG
75 TABLET ORAL DAILY
Refills: 0 | Status: DISCONTINUED | OUTPATIENT
Start: 2023-05-01 | End: 2023-05-03

## 2023-05-01 RX ORDER — SODIUM,POTASSIUM PHOSPHATES 278-250MG
1 POWDER IN PACKET (EA) ORAL ONCE
Refills: 0 | Status: COMPLETED | OUTPATIENT
Start: 2023-05-01 | End: 2023-05-01

## 2023-05-01 RX ADMIN — Medication 81 MILLIGRAM(S): at 11:47

## 2023-05-01 RX ADMIN — HEPARIN SODIUM 5000 UNIT(S): 5000 INJECTION INTRAVENOUS; SUBCUTANEOUS at 13:22

## 2023-05-01 RX ADMIN — Medication 1 TABLET(S): at 11:47

## 2023-05-01 NOTE — PROGRESS NOTE ADULT - PROBLEM SELECTOR PLAN 6
overall suspect progression of dementia w some behavioral disturbance - no need for psych at this time as no agitation reported TSH 2.49, pt has post-ablative hypothyroidism per PCP and Endocrinologist Dr. Grace Babin  Continue levothyroxine 75mcg daily

## 2023-05-01 NOTE — PROVIDER CONTACT NOTE (OTHER) - REASON
Pt has No IV aceess
Unable to complete patient profile
Patient has no IV access and refusing to wear tele monitoring

## 2023-05-01 NOTE — PROGRESS NOTE ADULT - PROBLEM SELECTOR PLAN 5
CT showed old infarct  unclear why she's not on statin or ASA  c/w ASA and will start Statin overall suspect progression of dementia w some behavioral disturbance - no need for psych at this time as no agitation reported

## 2023-05-01 NOTE — PROGRESS NOTE ADULT - PROBLEM SELECTOR PLAN 7
TSH 2.49  Continue levothyroxine 75mcg daily - not sure why shes on synthroid when reported to have hx of hyperthyroid - need to clarify this prior to dc - reaching out to out-pt provider and pt pharmacy TSH 2.49, pt has post-ablative hypothyroidism per PCP and Endocrinologist Dr. Grace Babin  Continue levothyroxine 75mcg daily Low VTE score encourage ambulation    Discussed with Guardian Sdaia Guallpa 5/1

## 2023-05-01 NOTE — PROVIDER CONTACT NOTE (OTHER) - BACKGROUND
pt admitted with altered mental status
Patient admitted with altered mental status. Hx of graves disease, DAWIT and frontal lobe dementia.
Patient admitted with altered mental status. Hx of graves disease, DAWIT and frontal lobe dementia.

## 2023-05-01 NOTE — PROVIDER CONTACT NOTE (OTHER) - ACTION/TREATMENT ORDERED:
PRO Richardson aware. Will assess need for tele and IV.
Will continue to assess A&O status and complete profile when appropriate.
continuous monitoring ongoing

## 2023-05-01 NOTE — PROVIDER CONTACT NOTE (OTHER) - SITUATION
Unable to complete patient profile
pt removed IV access and is refusing another one
Patient has no IV access and refusing to wear tele monitoring

## 2023-05-01 NOTE — PROVIDER CONTACT NOTE (OTHER) - RECOMMENDATIONS
PRO Richardson at bedside to assess patient. Patient calmed down and moved out of bed to chair. Patient still refusing to wear tele monitoring or new IV access.
no new orders at this time
Will continue to assess A&O status and complete profile when appropriate.

## 2023-05-01 NOTE — PROVIDER CONTACT NOTE (OTHER) - ASSESSMENT
Unable to complete patient profile due to A&O status. Pt disoriented to time and situation at times.
Patient confused, anxious and agitated, wanting to leave hospital. Tele monitoring and IV access removed by patient.
pt is not receiving any iv meds/ fluids

## 2023-05-01 NOTE — PROGRESS NOTE ADULT - PROBLEM SELECTOR PLAN 1
suspecting AMS was due to toxic substance, possible alcohol was found w empty bottle of Vodka prior to arrival) and benzo ( as seen on Utox)  currentl AOx2-3, had incoherent and tangential answerers now improved today  CT noted for age-indeterminate infarct,   syphilis, Folate, B12, UA, TSH wnl   neuro eval mg - no concern for CVA- no further work up   c/w CIWA has been 0-1 for > 72hrs  will dc CIWA in the AM suspecting AMS was due to toxic substance, possible alcohol was found w empty bottle of Vodka prior to arrival) and benzo ( as seen on Utox)  currently AOx2-3, had incoherent and tangential answerers now improved today  CT noted for age-indeterminate infarct,   syphilis, Folate, B12, UA, TSH wnl   neuro eval mg - no concern for CVA- no further work up   c/w CIWA has been 0-1 for > 72hrs  will dc CIWA in the AM  f/u TTE

## 2023-05-01 NOTE — PROGRESS NOTE ADULT - PROBLEM SELECTOR PROBLEM 5
CVA, old, cognitive deficits Frontal lobe dementia V-Y Flap Text: The defect edges were debeveled with a #15 scalpel blade.  Given the location of the defect, shape of the defect and the proximity to free margins a V-Y flap was deemed most appropriate.  Using a sterile surgical marker, an appropriate advancement flap was drawn incorporating the defect and placing the expected incisions within the relaxed skin tension lines where possible.    The area thus outlined was incised deep to adipose tissue with a #15 scalpel blade.  The skin margins were undermined to an appropriate distance in all directions utilizing iris scissors.

## 2023-05-02 ENCOUNTER — TRANSCRIPTION ENCOUNTER (OUTPATIENT)
Age: 58
End: 2023-05-02

## 2023-05-02 PROCEDURE — 99232 SBSQ HOSP IP/OBS MODERATE 35: CPT

## 2023-05-02 RX ORDER — THIAMINE MONONITRATE (VIT B1) 100 MG
100 TABLET ORAL DAILY
Refills: 0 | Status: DISCONTINUED | OUTPATIENT
Start: 2023-05-02 | End: 2023-05-03

## 2023-05-02 RX ADMIN — Medication 100 MILLIGRAM(S): at 14:45

## 2023-05-02 RX ADMIN — HEPARIN SODIUM 5000 UNIT(S): 5000 INJECTION INTRAVENOUS; SUBCUTANEOUS at 14:45

## 2023-05-02 RX ADMIN — HEPARIN SODIUM 5000 UNIT(S): 5000 INJECTION INTRAVENOUS; SUBCUTANEOUS at 22:51

## 2023-05-02 RX ADMIN — Medication 75 MICROGRAM(S): at 09:01

## 2023-05-02 RX ADMIN — Medication 81 MILLIGRAM(S): at 12:41

## 2023-05-02 NOTE — PROGRESS NOTE ADULT - PROBLEM SELECTOR PLAN 4
pre-renal in the setting of dehydration  Cr improved
pre-renal  resolved
pre-renal in the setting of dehydration  Cr improved

## 2023-05-02 NOTE — PROGRESS NOTE ADULT - PROBLEM SELECTOR PLAN 3
elevated at presentation  s/p IVF  Will f/u repeat CK level
overall suspect progression of dementia w some behavioral disturbance - no need for psych at this time as no agitation reported
elevated at presentation  s/p IVF  Improved CK level

## 2023-05-02 NOTE — DISCHARGE NOTE PROVIDER - CARE PROVIDER_API CALL
MARISOL MCCOLLUM  Internal Medicine  Formerly [facility] [address] [place] [address]  Phone: [phone]  Fax: [phone]  Established Patient  Follow Up Time: 1 week

## 2023-05-02 NOTE — PROGRESS NOTE ADULT - PROBLEM SELECTOR PLAN 1
suspecting AMS was due to toxic substance, possible alcohol was found w empty bottle of Vodka prior to arrival) and benzo ( as seen on Utox)  currently AOx2-3, had incoherent and tangential answerers now improved today  CT noted for age-indeterminate infarct,   syphilis, Folate, B12, UA, TSH wnl   neuro eval mg - no concern for CVA- no further work up   c/w CIWA has been 0-1 for > 72hrs, off CIWA  TTE with no acute findings, albeit limited study, outpt PCP follow up

## 2023-05-02 NOTE — PROGRESS NOTE ADULT - ASSESSMENT
57 yr old female w reported hx of dementia, hypothyroid presenting with metabolic encephalopathy 

## 2023-05-02 NOTE — PROGRESS NOTE ADULT - PROBLEM SELECTOR PLAN 2
as above
Elevated Na level likely due to dehydration  Improving with IVF
Elevated Na level likely due to dehydration  Improving with IVF

## 2023-05-02 NOTE — DISCHARGE NOTE PROVIDER - HOSPITAL COURSE
57 yr old female w reported hx of dementia, hypothyroid presenting with metabolic encephalopathy.    Hospital Course:     Toxic metabolic encephalopathy.   - suspecting AMS was due to toxic substance, possible alcohol was found w empty bottle of Vodka prior to arrival) and benzo ( as seen on Utox)  - currently AOx2-3, had incoherent and tangential answerers now improved today  - CT noted for age-indeterminate infarct,   - syphilis, Folate, B12, UA, TSH wnl   - neuro eval mg - no concern for CVA- no further work up   c- /w CIWA has been 0-1 for > 72hrs, off CIWA  - TTE with no acute findings, albeit limited study, outpt PCP follow up.    Dehydration with hypernatremia.   - Elevated Na level likely due to dehydration  - Improving with IVF.    Elevated CK.   - elevated at presentation  - s/p IVF  - Improved CK level.    DAWIT (acute kidney injury).   - pre-renal in the setting of dehydration  - Cr improved.    Frontal lobe dementia.   - overall suspect progression of dementia w some behavioral disturbance - no need for psych at this time as no agitation reported.    Graves disease.   - TSH 2.49, pt has post-ablative hypothyroidism per PCP and Endocrinologist Dr. Grace Babin ( Initially on 88mcg)  - Continue levothyroxine 75mcg daily  - Outpt Endo follow up.      On _______ this case was reviewed with  _______, the patient is medically stable and optimized for discharge. All medications were reviewed and prescriptions were sent to mutually agreed upon pharmacy.    INCOMPLETE________________________   57 yr old female w reported hx of dementia, hypothyroid presenting with metabolic encephalopathy.    Hospital Course:     Toxic metabolic encephalopathy.   - suspecting AMS was due to toxic substance, possible alcohol was found w empty bottle of Vodka prior to arrival) and benzo ( as seen on Utox)  - currently AOx2-3, had incoherent and tangential answerers now improved today  - CT noted for age-indeterminate infarct,   - syphilis, Folate, B12, UA, TSH wnl   - neuro eval mg - no concern for CVA- no further work up   c- /w CIWA has been 0-1 for > 72hrs, off CIWA  - TTE with no acute findings, albeit limited study, outpt PCP follow up.    Dehydration with hypernatremia.   - Elevated Na level likely due to dehydration  - Improving with IVF.    Elevated CK.   - elevated at presentation  - s/p IVF  - Improved CK level.    DAWIT (acute kidney injury).   - pre-renal in the setting of dehydration  - Cr improved.    Frontal lobe dementia.   - overall suspect progression of dementia w some behavioral disturbance - no need for psych at this time as no agitation reported.    Graves disease.   - TSH 2.49, pt has post-ablative hypothyroidism per PCP and Endocrinologist Dr. Grace Babin ( Initially on 88mcg)  - Continue levothyroxine 75mcg daily  - Outpt Endo follow up.      On 5/3/23 this case was reviewed with Dr. Dempsey, the patient is medically stable and optimized for discharge. All medications were reviewed and prescriptions were sent to mutually agreed upon pharmacy.   57 yr old female w reported hx of dementia, hypothyroid presenting with metabolic encephalopathy.    Hospital Course:     Toxic metabolic encephalopathy.   - suspecting AMS was due to toxic substance, possible alcohol was found w empty bottle of Vodka prior to arrival) and benzo ( as seen on Utox). s/p CIWA monitoring  - currently AOx2-3, had incoherent and tangential answerers now improved today  - CT noted for age-indeterminate infarct,   - syphilis, Folate, B12, UA, TSH wnl   - neuro eval mg - no concern for CVA- no further work up   - TTE with no acute findings, albeit limited study, outpt PCP follow up.    Dehydration with hypernatremia.   - Elevated Na level likely due to dehydration  - Improving with IVF.    Elevated CK.   - elevated at presentation  - s/p IVF  - Improved CK level.    DAWIT (acute kidney injury).   - pre-renal in the setting of dehydration  - Cr improved.    Frontal lobe dementia.   - overall suspect progression of dementia w some behavioral disturbance - no need for psych at this time as no agitation reported. Oupt Neuro follow up    Graves disease.   - TSH 2.49, pt has post-ablative hypothyroidism per PCP and Endocrinologist Dr. Graec Babin ( Initially on 88mcg)  - Continue levothyroxine 75mcg daily  - Outpt Endo follow up.      On 5/3/23 pt is at baseline, denies CP/SOB. Alert and oriented x 2-3 ( does not remember  date), the patient is medically stable and optimized for discharge. All medications were reviewed 57 yr old female w reported hx of dementia, hypothyroid presenting with metabolic encephalopathy.    Hospital Course:     Toxic metabolic encephalopathy.   - suspecting AMS was due to toxic substance, possible alcohol was found w empty bottle of Vodka prior to arrival) and benzo ( as seen on Utox). s/p CIWA monitoring  - currently AOx2-3, had incoherent and tangential answerers now improved today  - CT noted for age-indeterminate infarct,   - syphilis, Folate, B12, UA, TSH wnl   - neuro eval mg - no concern for CVA- no further work up   - TTE with no acute findings, albeit limited study, outpt PCP follow up.  - Recommended Thiamine po, pt declined, discussed with legal Guardian Sadia on 5/3, she state she will attempt to get it over the counter if she changes her mine    Dehydration with hypernatremia.   - Elevated Na level likely due to dehydration  - Improving with IVF.    Elevated CK.   - elevated at presentation  - s/p IVF  - Improved CK level.    DAWIT (acute kidney injury).   - pre-renal in the setting of dehydration  - Cr improved.    Frontal lobe dementia.   - overall suspect progression of dementia w some behavioral disturbance - no need for psych at this time as no agitation reported. Oupt Neuro follow up    Graves disease.   - TSH 2.49, pt has post-ablative hypothyroidism per PCP and Endocrinologist Dr. Grace Babin ( Initially on 88mcg)  - Continue levothyroxine 75mcg daily  - Outpt Endo follow up.      On 5/3/23 pt is at baseline, denies CP/SOB. Alert and oriented x 2-3 ( does not remember  date), the patient is medically stable and optimized for discharge. All medications were reviewed

## 2023-05-02 NOTE — PROGRESS NOTE ADULT - PROBLEM SELECTOR PLAN 7
Low VTE score encourage ambulation    Discussed with Guardian Sadia Guallpa 5/2  Awaiting Safe home dc plan

## 2023-05-02 NOTE — DISCHARGE NOTE PROVIDER - NSDCMRMEDTOKEN_GEN_ALL_CORE_FT
levothyroxine 75 mcg (0.075 mg) oral tablet: 1 tab(s) orally once a day   aspirin 81 mg oral delayed release tablet: 1 tab(s) orally once a day  levothyroxine 75 mcg (0.075 mg) oral tablet: 1 tab(s) orally once a day  Physical Therapy: 2-3x weekly to achieve goals

## 2023-05-02 NOTE — DISCHARGE NOTE PROVIDER - NSDCCPCAREPLAN_GEN_ALL_CORE_FT
PRINCIPAL DISCHARGE DIAGNOSIS  Diagnosis: Frontal lobe dementia  Assessment and Plan of Treatment: You were seen for altered mental status. You had a CAT scan which was negative for bleeding or stroke. Your labs were negative for infection. Your behavior is likely due to your underlying frontal lobe dementia. Please follow up as outpatient with your PCP.      SECONDARY DISCHARGE DIAGNOSES  Diagnosis: Graves disease  Assessment and Plan of Treatment: Continue to comply with your Synthroid and follow up as outpatient with your Endocrinologist Dr Grace Babin.     PRINCIPAL DISCHARGE DIAGNOSIS  Diagnosis: Toxic metabolic encephalopathy  Assessment and Plan of Treatment: You were seen for altered mental status likely due to benzodiazepine use, You had a CAT scan which was negative for bleeding or stroke. Your labs were negative for infection. Please follow up as outpatient with your PCP. Avoid benzodiazpine and alcohol use      SECONDARY DISCHARGE DIAGNOSES  Diagnosis: Graves disease  Assessment and Plan of Treatment: Continue to comply with your Synthroid and follow up as outpatient with your Endocrinologist Dr Grace Babin.    Diagnosis: Frontal lobe dementia  Assessment and Plan of Treatment: Please follow up with Primary care and Neurologist

## 2023-05-02 NOTE — PROGRESS NOTE ADULT - PROBLEM/PLAN-6
HISTORY OF PRESENT ILLNESS:  Noris is in today for her annual preventative screening exam. She is having no specific GYN-related problems or concerns. She has been performing breast self exam and has noted no abnormalities or new findings. She is scheduled today for mammogram screening.    I have reviewed the patient's medications and allergies, past medical, surgical, social and family history, updating these as appropriate.  See Histories section of the EMR for a display of this information.    Patient Active Problem List   Diagnosis   • Bunion   • Unspecified hypothyroidism   • Well woman exam with routine gynecological exam   • Blood pressure elevated without history of HTN   • Status post total bilateral knee replacement   • History of colon polyps   • Fatigue       REVIEW OF SYSTEMS:  CONSTITUTIONAL: Denies unintentional weight loss.  NEUROLOGIC: No problems with headaches    CANCER HISTORY: Negative  THYROID PROBLEMS: None  DIABETES: No  HEART PROBLEMS: None  HYPERTENSION: No   HISTORY OF DEEP VENOUS THROMBOSIS: Negative  RESPIRATORY PROBLEMS: None  GASTROINTESTINAL PROBLEMS: None  GENITOURINARY: Denies new increased frequency, dysuria, itching, or burning.  SKIN: No new rashes or lesions.  ANXIETY/DEPRESSION: None  BREAST: Denies breast lump, nipple discharge, breast pain.  HISTORY OF BREAST BIOPSY: None  HISTORY OF ENDOMETRIOSIS: None  HISTORY OF FIBROIDS: None  HISTORY OF SEXUALLY TRANSMITTED DISEASE: None  HISTORY OF PAP SMEAR PROBLEMS: None  LAST PAP: January 2017-normal with negative high-risk HPV testing      PHYSICAL EXAM:   Visit Vitals  /76   Pulse 68   Ht 5' 7\" (1.702 m)   Wt 65.2 kg   LMP 01/27/2015   BMI 22.51 kg/m²   , Body mass index is 22.51 kg/m².  Constitutional:  Well-developed well-nourished middle-aged white female who appears her stated age. She is alert, oriented x3. Affect is appropriate.  HENT:  No lymphadenopathy or thyromegaly.  Respiratory: Clear to auscultation and 
percussion.  Back: Without deformity or costovertebral angle tenderness.   Cardiovascular:  Reveals regular rate and rhythm without apparent murmur click or rub.  Breasts exam: Without obvious or dominant mass. There is no skin retraction or dimpling, no nipple discharge, no axillary or supraclavicular adenopathy.  Gastrointestinal:  Abdomen is soft, nontender without apparent mass, hepatosplenomegaly or defect. Bowel sounds are normal in all 4 quadrants.  Pelvic:  Normal-appearing external genitalia. Vaginal mucosa, BUS and periurethral tissues are normal in appearance. Cervix is multiparous in appearance. There are no cervical abnormalities seen. Bimanual pelvic examination reveals uterus to be of normal size, shape, and consistency. It is anteverted, anteflexed, nontender and mobile. No obvious adnexal mass or tenderness is noted. Rectovaginal exam is confirmatory of these findings with no other apparent rectal pathology. Rectal sphincter tone is grossly normal.     ASSESSMENT:  Annual well woman exam    PLAN:  Pap smear deferred.  Mammogram today.  Annual follow-up reinforced.    
DISPLAY PLAN FREE TEXT
DISPLAY PLAN FREE TEXT

## 2023-05-02 NOTE — PROGRESS NOTE ADULT - PROBLEM SELECTOR PLAN 5
overall suspect progression of dementia w some behavioral disturbance - no need for psych at this time as no agitation reported Patient/surrogate refused vaccine...

## 2023-05-02 NOTE — PROGRESS NOTE ADULT - SUBJECTIVE AND OBJECTIVE BOX
[facility] Division of Hospital Medicine  Vincent Dempsey MD  Pager [phone]      Patient is a 57y old  Female who presents with a chief complaint of Encephalopathy (2023 09:37)      SUBJECTIVE / OVERNIGHT EVENTS: Denies CP or SOB. Insisting on getting her clothing to leave the hospital.     MEDICATIONS  (STANDING):  aspirin  chewable 81 milliGRAM(s) Oral daily  heparin   Injectable 5000 Unit(s) SubCutaneous every 8 hours  potassium phosphate IVPB 30 milliMole(s) IV Intermittent once    MEDICATIONS  (PRN):  acetaminophen     Tablet .. 650 milliGRAM(s) Oral every 6 hours PRN Temp greater or equal to 38C (100.4F), Mild Pain (1 - 3)  aluminum hydroxide/magnesium hydroxide/simethicone Suspension 30 milliLiter(s) Oral every 4 hours PRN Dyspepsia  melatonin 3 milliGRAM(s) Oral at bedtime PRN Insomnia  ondansetron Injectable 4 milliGRAM(s) IV Push every 8 hours PRN Nausea and/or Vomiting      CAPILLARY BLOOD GLUCOSE        I&O's Summary    2023 07:  -  01 May 2023 07:00  --------------------------------------------------------  IN: 1540 mL / OUT: 0 mL / NET: 1540 mL    01 May 2023 07:01  -  01 May 2023 11:04  --------------------------------------------------------  IN: 200 mL / OUT: 0 mL / NET: 200 mL        PHYSICAL EXAM:  Vital Signs Last 24 Hrs  T(C): 36.7 (01 May 2023 04:00), Max: 36.9 (2023 20:00)  T(F): 98 (01 May 2023 04:00), Max: 98.4 (2023 20:00)  HR: 78 (01 May 2023 04:00) (71 - 84)  BP: 104/76 (01 May 2023 04:00) (100/74 - 114/79)  BP(mean): --  RR: 17 (01 May 2023 04:00) (17 - 18)  SpO2: 98% (01 May 2023 04:00) (96% - 100%)    Parameters below as of 01 May 2023 04:00  Patient On (Oxygen Delivery Method): room air      CONSTITUTIONAL: NAD  EYES: proptosis  ENMT: mmm  NECK: Supple,  RESPIRATORY: Normal respiratory effort;  CARDIOVASCULAR: Regular rate and rhythm, + S1 and S2  ABDOMEN: Nontender to palpation, normoactive bowel sounds, no rebound/guarding  MUSCULOSKELETAL:  no clubbing or cyanosis of digits;   PSYCH: A+O x 2 ( self and place)  NEUROLOGY: forgetful  SKIN: hyperkeratotic skin changes LE bilaterally, bilateral lypmhedema    LABS:                        11.1   5.17  )-----------( 137      ( 01 May 2023 07:00 )             34.4     05-01    145  |  106  |  11  ----------------------------<  74  4.6   |  27  |  0.63    Ca    9.0      01 May 2023 07:00  Phos  1.4     05-01  Mg     1.60     05-01    TPro  5.9<L>  /  Alb  3.2<L>  /  TBili  0.6  /  DBili  x   /  AST  39<H>  /  ALT  14  /  AlkPhos  59  04-29      CARDIAC MARKERS ( 2023 11:40 )  x     / x     / 1322 U/L / x     / x          Urinalysis Basic - ( 2023 14:06 )    Color: Yellow / Appearance: Slightly Turbid / S.017 / pH: x  Gluc: x / Ketone: Trace  / Bili: Negative / Urobili: <2 mg/dL   Blood: x / Protein: Trace / Nitrite: Negative   Leuk Esterase: Negative / RBC: 3 /HPF / WBC 6 /HPF   Sq Epi: x / Non Sq Epi: x / Bacteria: Negative              COORDINATION OF CARE:  Care Discussed with Consultants/Other Providers [Y/N]: Dr. Jenkins ( PCP)    
[facility] Division of Hospital Medicine  Vincent Dempsey MD  Pager [phone]      Patient is a 57y old  Female who presents with a chief complaint of Encephalopathy (01 May 2023 11:04)      SUBJECTIVE / OVERNIGHT EVENTS: No CP, SOB. Pt insisting on going home     MEDICATIONS  (STANDING):  aspirin enteric coated 81 milliGRAM(s) Oral daily  heparin   Injectable 5000 Unit(s) SubCutaneous every 8 hours  levothyroxine 75 MICROGram(s) Oral daily  thiamine 100 milliGRAM(s) Oral daily    MEDICATIONS  (PRN):  acetaminophen     Tablet .. 650 milliGRAM(s) Oral every 6 hours PRN Temp greater or equal to 38C (100.4F), Mild Pain (1 - 3)  aluminum hydroxide/magnesium hydroxide/simethicone Suspension 30 milliLiter(s) Oral every 4 hours PRN Dyspepsia  melatonin 3 milliGRAM(s) Oral at bedtime PRN Insomnia  ondansetron Injectable 4 milliGRAM(s) IV Push every 8 hours PRN Nausea and/or Vomiting      CAPILLARY BLOOD GLUCOSE        I&O's Summary    01 May 2023 07:01  -  02 May 2023 07:00  --------------------------------------------------------  IN: 800 mL / OUT: 0 mL / NET: 800 mL    02 May 2023 07:01  -  02 May 2023 14:37  --------------------------------------------------------  IN: 1160 mL / OUT: 0 mL / NET: 1160 mL        PHYSICAL EXAM:  Vital Signs Last 24 Hrs  T(C): 36.9 (02 May 2023 07:30), Max: 36.9 (01 May 2023 17:35)  T(F): 98.4 (02 May 2023 07:30), Max: 98.4 (01 May 2023 17:35)  HR: 75 (02 May 2023 07:23) (75 - 77)  BP: 116/77 (02 May 2023 07:23) (102/74 - 116/77)  BP(mean): --  RR: 18 (02 May 2023 07:23) (18 - 19)  SpO2: 100% (02 May 2023 07:23) (99% - 100%)    Parameters below as of 01 May 2023 17:35  Patient On (Oxygen Delivery Method): room air      CONSTITUTIONAL: NAD  EYES: conjunctiva and sclera clear  ENMT: mmm  NECK: Supple,  RESPIRATORY: Normal respiratory effort; lungs are clear to auscultation bilaterally  CARDIOVASCULAR: Regular rate and rhythm, + S1 and S2  ABDOMEN: Nontender to palpation, normoactive bowel sounds, no rebound/guarding  MUSCULOSKELETAL:  no clubbing or cyanosis of digits;   PSYCH: A+O x 3  NEUROLOGY: no gross deficits   SKIN: No rashes;     LABS:                        11.1   5.17  )-----------( 137      ( 01 May 2023 07:00 )             34.4     05-01    145  |  106  |  11  ----------------------------<  74  4.6   |  27  |  0.63    Ca    9.0      01 May 2023 07:00  Phos  1.4     05-01  Mg     1.60     05-01        CARDIAC MARKERS ( 01 May 2023 07:00 )  x     / x     / 560 U/L / x     / x                  
Patient is a 57y old  Female who presents with a chief complaint of Encephalopathy (2023 14:16)      SUBJECTIVE / OVERNIGHT EVENTS: appears back to baseline - answering questions coherently today - denies any complaints     ROS:  No HA/DZ  No Vision changes   No CP, SOB  No N/V/D  No Edema  No Rash  NO weakness, numbness    MEDICATIONS  (STANDING):  aspirin  chewable 81 milliGRAM(s) Oral daily  heparin   Injectable 5000 Unit(s) SubCutaneous every 8 hours  lactated ringers. 1000 milliLiter(s) (100 mL/Hr) IV Continuous <Continuous>  levothyroxine 75 MICROGram(s) Oral daily    MEDICATIONS  (PRN):  acetaminophen     Tablet .. 650 milliGRAM(s) Oral every 6 hours PRN Temp greater or equal to 38C (100.4F), Mild Pain (1 - 3)  aluminum hydroxide/magnesium hydroxide/simethicone Suspension 30 milliLiter(s) Oral every 4 hours PRN Dyspepsia  melatonin 3 milliGRAM(s) Oral at bedtime PRN Insomnia  ondansetron Injectable 4 milliGRAM(s) IV Push every 8 hours PRN Nausea and/or Vomiting      T(C): 36.7 (23 @ 08:00)  HR: 81 (23 @ 08:00)  BP: 102/68 (23 @ 08:00)  RR: 17 (23 @ 08:00)  SpO2: 100% (23 @ 08:00)  CAPILLARY BLOOD GLUCOSE        I&O's Summary    2023 07:01  -  2023 07:00  --------------------------------------------------------  IN: 0 mL / OUT: 675 mL / NET: -675 mL        PHYSICAL EXAM:  GENERAL: NAD, well-developed, AOx3  HEAD:  Atraumatic, Normocephalic  EYES: EOMI, PERRL, conjunctiva and sclera clear  NECK: Supple, No JVD  CHEST/LUNG: Clear to auscultation bilaterally  HEART: Regular rate and rhythm; No murmurs, rubs, or gallops, No Edema  ABDOMEN: Soft, Nontender, Nondistended; Bowel sounds present  EXTREMITIES:  2+ Peripheral Pulses, No clubbing, cyanosis  PSYCH: No SI/HI  NEUROLOGY: non-focal  SKIN: No rashes or lesions    LABS:                        10.5   4.16  )-----------( 118      ( 2023 06:30 )             31.4     04    148<H>  |  111<H>  |  20  ----------------------------<  83  3.8   |  27  |  0.69    Ca    8.7      2023 06:30  Mg     1.90         TPro  5.9<L>  /  Alb  3.2<L>  /  TBili  0.6  /  DBili  x   /  AST  39<H>  /  ALT  14  /  AlkPhos  59  0429      CARDIAC MARKERS ( 2023 11:40 )  x     / x     / 1322 U/L / x     / x      CARDIAC MARKERS ( 2023 23:30 )  x     / x     / 1559 U/L / x     / x          Urinalysis Basic - ( 2023 14:06 )    Color: Yellow / Appearance: Slightly Turbid / S.017 / pH: x  Gluc: x / Ketone: Trace  / Bili: Negative / Urobili: <2 mg/dL   Blood: x / Protein: Trace / Nitrite: Negative   Leuk Esterase: Negative / RBC: 3 /HPF / WBC 6 /HPF   Sq Epi: x / Non Sq Epi: x / Bacteria: Negative            RADIOLOGY & ADDITIONAL TESTS:    Imaging Personally Reviewed:    Consultant(s) Notes Reviewed:      Care Discussed with Consultants/Other Providers:

## 2023-05-02 NOTE — PROGRESS NOTE ADULT - PROBLEM SELECTOR PLAN 6
TSH 2.49, pt has post-ablative hypothyroidism per PCP and Endocrinologist Dr. Grace Babin ( Initially on 88mcg)  Continue levothyroxine 75mcg daily  Outpt Endo follow up

## 2023-05-03 ENCOUNTER — TRANSCRIPTION ENCOUNTER (OUTPATIENT)
Age: 58
End: 2023-05-03

## 2023-05-03 VITALS
HEART RATE: 103 BPM | SYSTOLIC BLOOD PRESSURE: 138 MMHG | OXYGEN SATURATION: 100 % | TEMPERATURE: 99 F | DIASTOLIC BLOOD PRESSURE: 96 MMHG | RESPIRATION RATE: 18 BRPM

## 2023-05-03 PROCEDURE — 99239 HOSP IP/OBS DSCHRG MGMT >30: CPT

## 2023-05-03 RX ORDER — ASPIRIN/CALCIUM CARB/MAGNESIUM 324 MG
1 TABLET ORAL
Qty: 0 | Refills: 0 | DISCHARGE
Start: 2023-05-03

## 2023-05-03 RX ADMIN — Medication 75 MICROGRAM(S): at 06:49

## 2023-05-03 RX ADMIN — HEPARIN SODIUM 5000 UNIT(S): 5000 INJECTION INTRAVENOUS; SUBCUTANEOUS at 06:56

## 2023-05-03 NOTE — DISCHARGE NOTE NURSING/CASE MANAGEMENT/SOCIAL WORK - PATIENT PORTAL LINK FT
You can access the FollowMyHealth Patient Portal offered by [facility] by registering at the following website: [email or web address]. By joining [facility]’s FollowMyHealth portal, you will also be able to view your health information using other applications (apps) compatible with our system.

## 2023-05-03 NOTE — DISCHARGE NOTE NURSING/CASE MANAGEMENT/SOCIAL WORK - NSTRANSFERBELONGINGSDISPO_GEN_A_NUR
Belongings on unit:  Patient's cell phone  Black Raisa bracelet  Gold samina jeni bracelet  Gold bracelet/given to security/with patient

## 2023-05-03 NOTE — DISCHARGE NOTE NURSING/CASE MANAGEMENT/SOCIAL WORK - NSDCPEFALRISK_GEN_ALL_CORE
For information on Fall & Injury Prevention, visit: [email or web address] OR  [email or web address] OR  https://www.cdc.gov/steadi/patient.html

## 2023-09-12 RX ORDER — LEVOTHYROXINE SODIUM 125 MCG
1 TABLET ORAL
Refills: 0 | DISCHARGE

## 2024-08-21 NOTE — PATIENT PROFILE ADULT - FUNCTIONAL ASSESSMENT - DAILY ACTIVITY 2.
PFT Lab Note: Complete PFT (pre/post hilary, pleth, DLCO) done per Dr Hancock.   4 = No assist / stand by assistance
